# Patient Record
Sex: FEMALE | Race: BLACK OR AFRICAN AMERICAN | NOT HISPANIC OR LATINO | Employment: FULL TIME | URBAN - METROPOLITAN AREA
[De-identification: names, ages, dates, MRNs, and addresses within clinical notes are randomized per-mention and may not be internally consistent; named-entity substitution may affect disease eponyms.]

---

## 2024-10-11 ENCOUNTER — LAB (OUTPATIENT)
Dept: LAB | Facility: LAB | Age: 54
End: 2024-10-11
Payer: COMMERCIAL

## 2024-10-11 ENCOUNTER — APPOINTMENT (OUTPATIENT)
Dept: PRIMARY CARE | Facility: CLINIC | Age: 54
End: 2024-10-11
Payer: COMMERCIAL

## 2024-10-11 VITALS
DIASTOLIC BLOOD PRESSURE: 78 MMHG | WEIGHT: 218 LBS | BODY MASS INDEX: 33.04 KG/M2 | SYSTOLIC BLOOD PRESSURE: 118 MMHG | HEIGHT: 68 IN | TEMPERATURE: 97.2 F | OXYGEN SATURATION: 100 % | HEART RATE: 64 BPM

## 2024-10-11 DIAGNOSIS — Z00.00 ROUTINE GENERAL MEDICAL EXAMINATION AT A HEALTH CARE FACILITY: Primary | ICD-10-CM

## 2024-10-11 DIAGNOSIS — G47.33 OSA (OBSTRUCTIVE SLEEP APNEA): ICD-10-CM

## 2024-10-11 DIAGNOSIS — I10 PRIMARY HYPERTENSION: ICD-10-CM

## 2024-10-11 DIAGNOSIS — R73.03 PREDIABETES: ICD-10-CM

## 2024-10-11 DIAGNOSIS — E55.9 VITAMIN D DEFICIENCY: ICD-10-CM

## 2024-10-11 DIAGNOSIS — E78.5 HYPERLIPIDEMIA, UNSPECIFIED HYPERLIPIDEMIA TYPE: ICD-10-CM

## 2024-10-11 DIAGNOSIS — Z00.00 ROUTINE GENERAL MEDICAL EXAMINATION AT A HEALTH CARE FACILITY: ICD-10-CM

## 2024-10-11 DIAGNOSIS — G93.2 IIH (IDIOPATHIC INTRACRANIAL HYPERTENSION): ICD-10-CM

## 2024-10-11 PROBLEM — F41.1 GAD (GENERALIZED ANXIETY DISORDER): Status: ACTIVE | Noted: 2022-07-17

## 2024-10-11 PROBLEM — E11.9 TYPE 2 DIABETES MELLITUS WITHOUT COMPLICATION, WITHOUT LONG-TERM CURRENT USE OF INSULIN (MULTI): Status: ACTIVE | Noted: 2020-09-15

## 2024-10-11 PROBLEM — N39.0 RECURRENT UTI: Status: RESOLVED | Noted: 2017-11-13 | Resolved: 2024-10-11

## 2024-10-11 LAB
25(OH)D3 SERPL-MCNC: 20 NG/ML (ref 30–100)
ALBUMIN SERPL BCP-MCNC: 4.3 G/DL (ref 3.4–5)
ALP SERPL-CCNC: 139 U/L (ref 33–110)
ALT SERPL W P-5'-P-CCNC: 16 U/L (ref 7–45)
ANION GAP SERPL CALC-SCNC: 11 MMOL/L (ref 10–20)
AST SERPL W P-5'-P-CCNC: 15 U/L (ref 9–39)
BASOPHILS # BLD AUTO: 0.05 X10*3/UL (ref 0–0.1)
BASOPHILS NFR BLD AUTO: 1.1 %
BILIRUB SERPL-MCNC: 0.6 MG/DL (ref 0–1.2)
BUN SERPL-MCNC: 16 MG/DL (ref 6–23)
CALCIUM SERPL-MCNC: 9.1 MG/DL (ref 8.6–10.3)
CHLORIDE SERPL-SCNC: 111 MMOL/L (ref 98–107)
CHOLEST SERPL-MCNC: 242 MG/DL (ref 0–199)
CHOLESTEROL/HDL RATIO: 4.4
CO2 SERPL-SCNC: 23 MMOL/L (ref 21–32)
CREAT SERPL-MCNC: 0.77 MG/DL (ref 0.5–1.05)
EGFRCR SERPLBLD CKD-EPI 2021: >90 ML/MIN/1.73M*2
EOSINOPHIL # BLD AUTO: 0.14 X10*3/UL (ref 0–0.7)
EOSINOPHIL NFR BLD AUTO: 3.1 %
ERYTHROCYTE [DISTWIDTH] IN BLOOD BY AUTOMATED COUNT: 14.6 % (ref 11.5–14.5)
EST. AVERAGE GLUCOSE BLD GHB EST-MCNC: 108 MG/DL
GLUCOSE SERPL-MCNC: 101 MG/DL (ref 74–99)
HBA1C MFR BLD: 5.4 %
HCT VFR BLD AUTO: 38.1 % (ref 36–46)
HDLC SERPL-MCNC: 54.9 MG/DL
HGB BLD-MCNC: 12.9 G/DL (ref 12–16)
IMM GRANULOCYTES # BLD AUTO: 0.01 X10*3/UL (ref 0–0.7)
IMM GRANULOCYTES NFR BLD AUTO: 0.2 % (ref 0–0.9)
LDLC SERPL CALC-MCNC: 161 MG/DL
LYMPHOCYTES # BLD AUTO: 2.14 X10*3/UL (ref 1.2–4.8)
LYMPHOCYTES NFR BLD AUTO: 46.6 %
MCH RBC QN AUTO: 30.8 PG (ref 26–34)
MCHC RBC AUTO-ENTMCNC: 33.9 G/DL (ref 32–36)
MCV RBC AUTO: 91 FL (ref 80–100)
MONOCYTES # BLD AUTO: 0.33 X10*3/UL (ref 0.1–1)
MONOCYTES NFR BLD AUTO: 7.2 %
NEUTROPHILS # BLD AUTO: 1.92 X10*3/UL (ref 1.2–7.7)
NEUTROPHILS NFR BLD AUTO: 41.8 %
NON HDL CHOLESTEROL: 187 MG/DL (ref 0–149)
NRBC BLD-RTO: 0 /100 WBCS (ref 0–0)
PLATELET # BLD AUTO: 219 X10*3/UL (ref 150–450)
POTASSIUM SERPL-SCNC: 3.7 MMOL/L (ref 3.5–5.3)
PROT SERPL-MCNC: 7 G/DL (ref 6.4–8.2)
RBC # BLD AUTO: 4.19 X10*6/UL (ref 4–5.2)
SODIUM SERPL-SCNC: 141 MMOL/L (ref 136–145)
TRIGL SERPL-MCNC: 132 MG/DL (ref 0–149)
TSH SERPL-ACNC: 1.38 MIU/L (ref 0.44–3.98)
VLDL: 26 MG/DL (ref 0–40)
WBC # BLD AUTO: 4.6 X10*3/UL (ref 4.4–11.3)

## 2024-10-11 PROCEDURE — 82306 VITAMIN D 25 HYDROXY: CPT

## 2024-10-11 PROCEDURE — 1036F TOBACCO NON-USER: CPT | Performed by: INTERNAL MEDICINE

## 2024-10-11 PROCEDURE — 80061 LIPID PANEL: CPT

## 2024-10-11 PROCEDURE — 3008F BODY MASS INDEX DOCD: CPT | Performed by: INTERNAL MEDICINE

## 2024-10-11 PROCEDURE — 83036 HEMOGLOBIN GLYCOSYLATED A1C: CPT

## 2024-10-11 PROCEDURE — 80053 COMPREHEN METABOLIC PANEL: CPT

## 2024-10-11 PROCEDURE — 90471 IMMUNIZATION ADMIN: CPT | Performed by: INTERNAL MEDICINE

## 2024-10-11 PROCEDURE — 99204 OFFICE O/P NEW MOD 45 MIN: CPT | Performed by: INTERNAL MEDICINE

## 2024-10-11 PROCEDURE — 36415 COLL VENOUS BLD VENIPUNCTURE: CPT

## 2024-10-11 PROCEDURE — 3074F SYST BP LT 130 MM HG: CPT | Performed by: INTERNAL MEDICINE

## 2024-10-11 PROCEDURE — 90656 IIV3 VACC NO PRSV 0.5 ML IM: CPT | Performed by: INTERNAL MEDICINE

## 2024-10-11 PROCEDURE — 84443 ASSAY THYROID STIM HORMONE: CPT

## 2024-10-11 PROCEDURE — 85025 COMPLETE CBC W/AUTO DIFF WBC: CPT

## 2024-10-11 PROCEDURE — 3078F DIAST BP <80 MM HG: CPT | Performed by: INTERNAL MEDICINE

## 2024-10-11 RX ORDER — SALINE NASAL SPRAY 1.5 OZ
1 SOLUTION NASAL
COMMUNITY
Start: 2024-04-16

## 2024-10-11 RX ORDER — LIDOCAINE 50 MG/G
3 PATCH TOPICAL DAILY
Qty: 100 PATCH | Refills: 1 | Status: SHIPPED | OUTPATIENT
Start: 2024-10-11

## 2024-10-11 RX ORDER — CETIRIZINE HYDROCHLORIDE 10 MG/1
10 TABLET ORAL
COMMUNITY
Start: 2024-04-16

## 2024-10-11 RX ORDER — HYDROXYZINE HYDROCHLORIDE 50 MG/1
50 TABLET, FILM COATED ORAL EVERY 8 HOURS PRN
Qty: 30 TABLET | Refills: 3 | Status: SHIPPED | OUTPATIENT
Start: 2024-10-11 | End: 2024-11-10

## 2024-10-11 RX ORDER — FLUTICASONE PROPIONATE 50 MCG
1 SPRAY, SUSPENSION (ML) NASAL
COMMUNITY
Start: 2023-11-06

## 2024-10-11 RX ORDER — AMLODIPINE BESYLATE 10 MG/1
10 TABLET ORAL DAILY
COMMUNITY
End: 2024-10-11 | Stop reason: SDUPTHER

## 2024-10-11 RX ORDER — HYDROXYZINE HYDROCHLORIDE 50 MG/1
50 TABLET, FILM COATED ORAL EVERY 8 HOURS PRN
COMMUNITY
End: 2024-10-11 | Stop reason: SDUPTHER

## 2024-10-11 RX ORDER — MECLIZINE HYDROCHLORIDE 25 MG/1
25 TABLET ORAL
COMMUNITY
Start: 2024-09-12

## 2024-10-11 RX ORDER — ACETAZOLAMIDE 500 MG/1
500 CAPSULE, EXTENDED RELEASE ORAL 2 TIMES DAILY
COMMUNITY

## 2024-10-11 RX ORDER — AMLODIPINE BESYLATE 10 MG/1
10 TABLET ORAL DAILY
Qty: 90 TABLET | Refills: 3 | Status: SHIPPED | OUTPATIENT
Start: 2024-10-11

## 2024-10-11 RX ORDER — LIDOCAINE 50 MG/G
3 PATCH TOPICAL
COMMUNITY
Start: 2023-08-09 | End: 2024-10-11 | Stop reason: SDUPTHER

## 2024-10-11 RX ORDER — ATORVASTATIN CALCIUM 40 MG/1
40 TABLET, FILM COATED ORAL
COMMUNITY
Start: 2024-04-16 | End: 2025-04-16

## 2024-10-11 RX ORDER — AMMONIUM LACTATE 12 G/100G
LOTION TOPICAL
COMMUNITY
Start: 2023-02-27

## 2024-10-11 RX ORDER — OLOPATADINE HYDROCHLORIDE 1 MG/ML
1 SOLUTION/ DROPS OPHTHALMIC 2 TIMES DAILY
COMMUNITY
Start: 2024-04-16

## 2024-10-11 ASSESSMENT — ENCOUNTER SYMPTOMS
WOUND: 0
VOMITING: 0
DIZZINESS: 0
SORE THROAT: 0
APPETITE CHANGE: 0
NAUSEA: 0
TREMORS: 0
NUMBNESS: 0
WHEEZING: 0
WEAKNESS: 0
CONFUSION: 0
BLOOD IN STOOL: 0
FREQUENCY: 0
PALPITATIONS: 0
HEADACHES: 0
CONSTIPATION: 0
JOINT SWELLING: 0
EYE PAIN: 0
NERVOUS/ANXIOUS: 0
DYSURIA: 0
DIARRHEA: 0
SEIZURES: 0
FEVER: 0
ABDOMINAL PAIN: 0
SHORTNESS OF BREATH: 0
COUGH: 0
BACK PAIN: 0
EYE DISCHARGE: 0
TROUBLE SWALLOWING: 0
FLANK PAIN: 0
CHILLS: 0
HEMATURIA: 0
SLEEP DISTURBANCE: 0
UNEXPECTED WEIGHT CHANGE: 0

## 2024-10-11 ASSESSMENT — PATIENT HEALTH QUESTIONNAIRE - PHQ9: 1. LITTLE INTEREST OR PLEASURE IN DOING THINGS: NOT AT ALL

## 2024-10-11 NOTE — ASSESSMENT & PLAN NOTE
-She was recently started on Diamox, off topamax ( not effective), alos taking Meclizine and ear drops  -Had MRI of the brain and spinal tap on January of this year for headaches and tinnitus, pressure =26  -She has follow-up neurology appointment at  on October 29

## 2024-10-11 NOTE — PROGRESS NOTES
"Subjective   Patient ID: Camryn Durant is a 54 y.o. female who presents for Establish Care (Intercranial hypertension/Spinal headaches and numbness and tingling in the hands.//Lab work).    HPI   NEW PT ( previous PCP at [facility], [place])  Here to establish .  Chart reviewed, PMHX, meds, Social HX- updated at visit   Labs and imaging reviewed.  Ob-gyn - none  yet  Neurology at  - for IIH- next appt 29th/2024.    C scope 2020- polyps and diverticulosis- wants referral    Review of Systems   Constitutional:  Negative for appetite change, chills, fever and unexpected weight change.   HENT:  Positive for tinnitus (Rt ear). Negative for congestion, ear pain, sneezing, sore throat and trouble swallowing.    Eyes:  Negative for pain, discharge and visual disturbance.   Respiratory:  Negative for cough, shortness of breath and wheezing.    Cardiovascular:  Negative for chest pain, palpitations and leg swelling.   Gastrointestinal:  Negative for abdominal pain, blood in stool, constipation, diarrhea, nausea and vomiting.   Genitourinary:  Negative for dysuria, flank pain, frequency, hematuria and urgency.   Musculoskeletal:  Negative for back pain, gait problem and joint swelling.   Skin:  Negative for rash and wound.   Neurological:  Negative for dizziness, tremors, seizures, syncope, weakness, numbness and headaches.   Psychiatric/Behavioral:  Negative for confusion, sleep disturbance and suicidal ideas. The patient is not nervous/anxious.        Objective   /78   Pulse 64   Temp 36.2 °C (97.2 °F)   Ht 1.727 m (5' 8\")   Wt 98.9 kg (218 lb)   SpO2 100%   BMI 33.15 kg/m²     Physical Exam  Vitals and nursing note reviewed.   Constitutional:       General: She is not in acute distress.     Appearance: Normal appearance.   HENT:      Head: Normocephalic and atraumatic.      Nose: Nose normal.      Mouth/Throat:      Mouth: Mucous membranes are moist.      Pharynx: Oropharynx is clear.   Eyes:      Extraocular " Movements: Extraocular movements intact.      Conjunctiva/sclera: Conjunctivae normal.      Pupils: Pupils are equal, round, and reactive to light.   Cardiovascular:      Rate and Rhythm: Normal rate and regular rhythm.      Heart sounds: No murmur heard.  Pulmonary:      Effort: Pulmonary effort is normal. No respiratory distress.      Breath sounds: Normal breath sounds. No wheezing or rhonchi.   Abdominal:      General: Bowel sounds are normal.      Palpations: Abdomen is soft.      Tenderness: There is no abdominal tenderness.   Musculoskeletal:         General: Normal range of motion.      Cervical back: Neck supple.      Right lower leg: No edema.      Left lower leg: No edema.   Skin:     General: Skin is warm and dry.      Coloration: Skin is not pale.      Findings: No bruising or rash.   Neurological:      General: No focal deficit present.      Mental Status: She is alert and oriented to person, place, and time.      Motor: No weakness.      Gait: Gait normal.   Psychiatric:         Mood and Affect: Mood normal.         Behavior: Behavior normal.       Assessment/Plan   Assessment & Plan  Routine general medical examination at a health care facility    Orders:    CBC and Auto Differential; Future    Comprehensive Metabolic Panel; Future    TSH with reflex to Free T4 if abnormal; Future    Hemoglobin A1C; Future    Vitamin D 25-Hydroxy,Total (for eval of Vitamin D levels); Future    Lipid Panel; Future    Colonoscopy Screening; Average Risk Patient; Future    Prediabetes  -Per chart review last hemoglobin A1c was 6.1  -She was on injectable medicine for weight loss and that has been discontinued due to wt loss  Orders:    TSH with reflex to Free T4 if abnormal; Future    Hemoglobin A1C; Future    IIH (idiopathic intracranial hypertension)  -She was recently started on Diamox, off topamax ( not effective), alos taking Meclizine and ear drops  -Had MRI of the brain and spinal tap on January of this year for  headaches and tinnitus, pressure =26  -She has follow-up neurology appointment at  on October 29       CHRISTINE (obstructive sleep apnea)  -Chronic, and does not use CPAP on a daily basis, feels not needed due to wt loss       Primary hypertension  -blood Pressure is controlled on current medications( norvasc )        Hyperlipidemia, unspecified hyperlipidemia type  - adjust statin based on labs  Orders:    Lipid Panel; Future    Vitamin D deficiency    Orders:    Vitamin D 25-Hydroxy,Total (for eval of Vitamin D levels); Future     C scope ordered  Had recent mammogram  Meds refill  F/u 4 ms routine

## 2024-10-14 ENCOUNTER — TELEPHONE (OUTPATIENT)
Dept: PRIMARY CARE | Facility: CLINIC | Age: 54
End: 2024-10-14
Payer: COMMERCIAL

## 2024-10-14 NOTE — TELEPHONE ENCOUNTER
Patient is unreachable. No my chart to send a message to patient results might have to be mailed out to patient.

## 2024-10-14 NOTE — TELEPHONE ENCOUNTER
----- Message from Rita Morrison sent at 10/14/2024 10:21 AM EDT -----  Please notify patient that her cholesterol level is high, if she taking 40 mg of atorvastatin daily?   Vit D Level is low, she should take daily OTC vitamin D 2000 units, thanks

## 2024-10-29 ENCOUNTER — APPOINTMENT (OUTPATIENT)
Dept: NEUROLOGY | Facility: CLINIC | Age: 54
End: 2024-10-29
Payer: COMMERCIAL

## 2024-12-10 ENCOUNTER — APPOINTMENT (OUTPATIENT)
Dept: OBSTETRICS AND GYNECOLOGY | Facility: CLINIC | Age: 54
End: 2024-12-10
Payer: COMMERCIAL

## 2024-12-10 VITALS — BODY MASS INDEX: 33.25 KG/M2 | DIASTOLIC BLOOD PRESSURE: 73 MMHG | WEIGHT: 218.7 LBS | SYSTOLIC BLOOD PRESSURE: 108 MMHG

## 2024-12-10 DIAGNOSIS — Z12.31 ENCOUNTER FOR SCREENING MAMMOGRAM FOR BREAST CANCER: ICD-10-CM

## 2024-12-10 DIAGNOSIS — Z12.4 PAP SMEAR FOR CERVICAL CANCER SCREENING: ICD-10-CM

## 2024-12-10 DIAGNOSIS — Z01.419 WELL WOMAN EXAM WITH ROUTINE GYNECOLOGICAL EXAM: Primary | ICD-10-CM

## 2024-12-10 PROCEDURE — 3078F DIAST BP <80 MM HG: CPT | Performed by: NURSE PRACTITIONER

## 2024-12-10 PROCEDURE — 99386 PREV VISIT NEW AGE 40-64: CPT | Performed by: NURSE PRACTITIONER

## 2024-12-10 PROCEDURE — 3044F HG A1C LEVEL LT 7.0%: CPT | Performed by: NURSE PRACTITIONER

## 2024-12-10 PROCEDURE — 1036F TOBACCO NON-USER: CPT | Performed by: NURSE PRACTITIONER

## 2024-12-10 PROCEDURE — 3050F LDL-C >= 130 MG/DL: CPT | Performed by: NURSE PRACTITIONER

## 2024-12-10 PROCEDURE — 87624 HPV HI-RISK TYP POOLED RSLT: CPT

## 2024-12-10 PROCEDURE — 3074F SYST BP LT 130 MM HG: CPT | Performed by: NURSE PRACTITIONER

## 2024-12-10 NOTE — PROGRESS NOTES
HPI: Camryn Durant is a 54 y.o. year old  presenting for annual gyn exam   Pt new to this pratice  S/p hysterectomy -, AUB, fibroid and failed ablation, pathology benign but remote h/o LEEP  Single, has son 30s, and two others in 20s, has two grandchildren  Pt works at [employer] in [employer]  Working on weight loss, lost over 50lbs  Current concerns: none    LMP:  No LMP recorded. Patient has had a hysterectomy.; Menopause at 45-7yo, no HRT  Abnormal bleeding: no  Hot flashes/night sweats: better now  Sexually active: no  Vaginal dryness: no   STI concerns: no  Last mammogram: 10/2023 nml   Last pap:  normal, neg HPV  History of abnormal pap: LEEP  HGSIL, neg pap since then  Other gyn history: tubal pregnancy with unilateral salpingoophorectomy; LEEP, supracervical hyster/RSO   OB History    No obstetric history on file.      Past Medical History:  2017: Recurrent UTI   History reviewed. No pertinent surgical history.   Social History    Socioeconomic History      Marital status: Single      Spouse name: Not on file      Number of children: Not on file      Years of education: Not on file      Highest education level: Not on file    Occupational History      Not on file    Tobacco Use      Smoking status: Former        Types: Cigarettes      Smokeless tobacco: Never    Substance and Sexual Activity      Alcohol use: Yes        Comment: occasional beers      Drug use: Never      Sexual activity: Not on file    Other Topics      Concerns:        Not on file    Social History Narrative      Not on file    Social Drivers of Health  Financial Resource Strain: Medium Risk (2020)      Received from [employer]      Overall Financial Resource Strain (CARDIA)          Difficulty of Paying Living Expenses: Somewhat hard  Food Insecurity: No Food Insecurity (2020)      Received from [employer]      Hunger Vital Sign          Worried About Running Out of Food in the Last Year: Never  true          Ran Out of Food in the Last Year: Never true  Transportation Needs: No Transportation Needs (7/8/2020)      [facility]          Lack of Transportation (Medical): No          Lack of Transportation (Non-Medical): No  Physical Activity: Not on file  Stress: Not on file  Social Connections: Not on file  Intimate Partner Violence: Not on file  Housing Stability: Not on file   No family history on file.     Current Outpatient Medications:   acetaZOLAMIDE (Diamox) 500 mg 12 hr capsule, Take 1 capsule (500 mg) by mouth 2 times a day., Disp: , Rfl:   amLODIPine (Norvasc) 10 mg tablet, Take 1 tablet (10 mg) by mouth once daily., Disp: 90 tablet, Rfl: 3  ammonium lactate (Lac-Hydrin) 12 % lotion, Apply topically thin layer to affected area 2x daily., Disp: , Rfl:   cetirizine (ZyrTEC) 10 mg tablet, Take 1 tablet (10 mg) by mouth once daily., Disp: , Rfl:   Deep Sea Nasal 0.65 % nasal spray, Administer 1 spray into affected nostril(s)., Disp: , Rfl:   fluticasone (Flonase) 50 mcg/actuation nasal spray, Administer 1 spray into each nostril once daily., Disp: , Rfl:   hydrOXYzine HCL (Atarax) 50 mg tablet, Take 1 tablet (50 mg) by mouth every 8 hours if needed for itching., Disp: 30 tablet, Rfl: 3  lidocaine (Lidoderm) 5 % patch, Place 1 patch to the affected area of the skin once daily.  Leave the patch on for 12 hours then have a patch free interval for 12 hours.  May apply up to 3 patches daily as directed, Disp: 100 patch, Rfl: 1  meclizine (Antivert) 25 mg tablet, Take 1 tablet (25 mg) by mouth., Disp: , Rfl:           REVIEW OF SYSTEMS:  Breast. denies masses, nipple discharge  : denies dysuria, hematuria incontinence   Gl: denies change in bowel habits, blood in stools      PHYSICAL EXAM:  Vitals: /73 (BP Location: Left arm, Patient Position: Sitting)   Wt 99.2 kg (218 lb 11.2 oz)   BMI 33.25 kg/m²   Gen: well appearing woman in NAD  HEENT:  normocephalic, thyroid not enlarged, no lymphadenopathy  CV: no m/r/g  Chest: CTAB, no w/r/r  Breast: normal tissue bilaterally without masses, skin changes, lymphadenopathy   Abdomen: soft, nontender, no masses/hernias, liver and spleen not enlarged   Pelvic:  - external genitalia: normal  - vagina: normal; atrophic changes noted  - cervix: normal  - uterus and adnexa: surgically absent  -  no palpable masses or tenderness    ASSESSMENT/PLAN :    1) Health maintenance, Well-woman exam.  Pap done with HPV.  Mammogram ordered  Nutrition, exercise and routine health maintenance exams reviewed.  Calcium/Vitamin D supplementation information provided   Smoking cessation: non-smoker  2) Postmenopausal: no concerns  3) STD screening: declines, no concerns  4) Follow up one year or sooner as needed

## 2024-12-18 PROCEDURE — RXMED WILLOW AMBULATORY MEDICATION CHARGE

## 2024-12-26 LAB
CYTOLOGY CMNT CVX/VAG CYTO-IMP: NORMAL
HPV HR 12 DNA GENITAL QL NAA+PROBE: NEGATIVE
HPV HR GENOTYPES PNL CVX NAA+PROBE: NEGATIVE
HPV16 DNA SPEC QL NAA+PROBE: NEGATIVE
HPV18 DNA SPEC QL NAA+PROBE: NEGATIVE
LAB AP HPV GENOTYPE QUESTION: YES
LAB AP HPV HR: NORMAL
LAB AP TREATMENT HISTORY: NORMAL
LABORATORY COMMENT REPORT: NORMAL
MENSTRUAL HX REPORTED: NORMAL
PATH REPORT.RELEVANT HX SPEC: NORMAL
PATH REPORT.TOTAL CANCER: NORMAL

## 2024-12-27 ENCOUNTER — PHARMACY VISIT (OUTPATIENT)
Dept: PHARMACY | Facility: CLINIC | Age: 54
End: 2024-12-27
Payer: COMMERCIAL

## 2024-12-27 ENCOUNTER — TELEPHONE (OUTPATIENT)
Dept: OBSTETRICS AND GYNECOLOGY | Facility: CLINIC | Age: 54
End: 2024-12-27
Payer: COMMERCIAL

## 2024-12-27 NOTE — TELEPHONE ENCOUNTER
Called patient. Patient verified by name and . Patient informed of results. Patient has no questions or concerns

## 2024-12-27 NOTE — TELEPHONE ENCOUNTER
Please notify pt of normal pap and neg HPV. RTC one year for annual and sooner as needed. Jordana Narvaez, APRN-CNP

## 2025-01-06 NOTE — H&P
Procedure H&P    Patient Profile-Procedures  Name Camryn Durant  Date of Birth 1970  MRN [id number]  Address   [address]   [facility] [id number]   [facility] [place] [facility] [address]    Primary Phone Number [phone]  Secondary Phone Number    Rita Asher    Procedure(s):  Procedures: Colonoscopy  Primary contact name and number   Extended Emergency Contact Information  Primary Emergency Contact: Kayden Iglesias  Mobile Phone: [phone]  Relation: Son  Preferred language: English   needed? No    General Health  Weight There were no vitals filed for this visit.  BMI There is no height or weight on file to calculate BMI.    Allergies  Allergies   Allergen Reactions    Sulfa (Sulfonamide Antibiotics) Hives and Itching    Sulfa Dyne Hives       Past Medical History   Past Medical History:   Diagnosis Date    Recurrent UTI 11/13/2017       Provider assessment  Diagnosis: Colon Cancer Screening/Surveillance   Medication Reviewed - yes  Prior to Admission medications    Medication Sig Start Date End Date Taking? Authorizing Provider   acetaZOLAMIDE (Diamox) 500 mg 12 hr capsule Take 1 capsule (500 mg) by mouth 2 times a day.    Historical Provider, MD   amLODIPine (Norvasc) 10 mg tablet Take 1 tablet (10 mg) by mouth once daily. 10/11/24   Rita Morrison MD   ammonium lactate (Lac-Hydrin) 12 % lotion Apply topically thin layer to affected area 2x daily. 2/27/23   Historical Provider, MD   cetirizine (ZyrTEC) 10 mg tablet Take 1 tablet (10 mg) by mouth once daily. 4/16/24   Historical Provider, MD   Deep Sea Nasal 0.65 % nasal spray Administer 1 spray into affected nostril(s). 4/16/24   Historical Provider, MD   fluticasone (Flonase) 50 mcg/actuation nasal spray Administer 1 spray into each nostril once daily. 11/6/23   Historical Provider, MD   hydrOXYzine HCL (Atarax) 50 mg tablet Take 1 tablet (50 mg) by mouth every 8 hours if needed for itching. 10/11/24 1/6/25  Rita Morrison MD    lidocaine (Lidoderm) 5 % patch Place 1 patch to the affected area of the skin once daily.  Leave the patch on for 12 hours then have a patch free interval for 12 hours.  May apply up to 3 patches daily as directed 10/11/24   Rita Morrison MD   meclizine (Antivert) 25 mg tablet Take 1 tablet (25 mg) by mouth. 9/12/24   Historical Provider, MD       Physical Exam  There were no vitals filed for this visit.     General: A&Ox3, NAD.  HEENT: AT/NC.   CV: RRR. No murmur.  Resp: CTA bilaterally. No wheezing, rhonchi or rales.   GI: Soft, NT/ND. BSx4.  Extrem: No edema. Pulses intact.  Neuro: No focal deficits.   Psych: Normal mood and affect.      Procedure Plan - pre-procedural (re)assesment completed by physician:  discharge/transfer patient when discharge criteria met    Rikki Jim MD  1/6/2025 9:13 AM

## 2025-01-08 ENCOUNTER — HOSPITAL ENCOUNTER (OUTPATIENT)
Dept: GASTROENTEROLOGY | Facility: HOSPITAL | Age: 55
Discharge: HOME | End: 2025-01-08
Payer: COMMERCIAL

## 2025-01-08 ENCOUNTER — ANESTHESIA EVENT (OUTPATIENT)
Dept: GASTROENTEROLOGY | Facility: HOSPITAL | Age: 55
End: 2025-01-08
Payer: COMMERCIAL

## 2025-01-08 ENCOUNTER — ANESTHESIA (OUTPATIENT)
Dept: GASTROENTEROLOGY | Facility: HOSPITAL | Age: 55
End: 2025-01-08
Payer: COMMERCIAL

## 2025-01-08 VITALS
BODY MASS INDEX: 33.15 KG/M2 | HEIGHT: 68 IN | OXYGEN SATURATION: 100 % | RESPIRATION RATE: 16 BRPM | HEART RATE: 56 BPM | TEMPERATURE: 98.2 F | WEIGHT: 218.7 LBS | DIASTOLIC BLOOD PRESSURE: 71 MMHG | SYSTOLIC BLOOD PRESSURE: 133 MMHG

## 2025-01-08 DIAGNOSIS — Z00.00 ROUTINE GENERAL MEDICAL EXAMINATION AT A HEALTH CARE FACILITY: ICD-10-CM

## 2025-01-08 LAB — GLUCOSE BLD MANUAL STRIP-MCNC: 99 MG/DL (ref 74–99)

## 2025-01-08 PROCEDURE — 3700000002 HC GENERAL ANESTHESIA TIME - EACH INCREMENTAL 1 MINUTE

## 2025-01-08 PROCEDURE — 7100000009 HC PHASE TWO TIME - INITIAL BASE CHARGE

## 2025-01-08 PROCEDURE — 2500000004 HC RX 250 GENERAL PHARMACY W/ HCPCS (ALT 636 FOR OP/ED): Performed by: NURSE ANESTHETIST, CERTIFIED REGISTERED

## 2025-01-08 PROCEDURE — 45380 COLONOSCOPY AND BIOPSY: CPT | Performed by: STUDENT IN AN ORGANIZED HEALTH CARE EDUCATION/TRAINING PROGRAM

## 2025-01-08 PROCEDURE — A45385 PR COLONOSCOPY,REMV LESN,SNARE: Performed by: ANESTHESIOLOGY

## 2025-01-08 PROCEDURE — 3700000001 HC GENERAL ANESTHESIA TIME - INITIAL BASE CHARGE

## 2025-01-08 PROCEDURE — 45385 COLONOSCOPY W/LESION REMOVAL: CPT | Performed by: STUDENT IN AN ORGANIZED HEALTH CARE EDUCATION/TRAINING PROGRAM

## 2025-01-08 PROCEDURE — 7100000010 HC PHASE TWO TIME - EACH INCREMENTAL 1 MINUTE

## 2025-01-08 PROCEDURE — 82947 ASSAY GLUCOSE BLOOD QUANT: CPT

## 2025-01-08 PROCEDURE — A45385 PR COLONOSCOPY,REMV LESN,SNARE: Performed by: NURSE ANESTHETIST, CERTIFIED REGISTERED

## 2025-01-08 RX ORDER — ONDANSETRON HYDROCHLORIDE 2 MG/ML
4 INJECTION, SOLUTION INTRAVENOUS ONCE AS NEEDED
Status: DISCONTINUED | OUTPATIENT
Start: 2025-01-08 | End: 2025-01-09 | Stop reason: HOSPADM

## 2025-01-08 RX ORDER — PROPOFOL 10 MG/ML
INJECTION, EMULSION INTRAVENOUS AS NEEDED
Status: DISCONTINUED | OUTPATIENT
Start: 2025-01-08 | End: 2025-01-08

## 2025-01-08 RX ORDER — LIDOCAINE HCL/PF 100 MG/5ML
SYRINGE (ML) INTRAVENOUS AS NEEDED
Status: DISCONTINUED | OUTPATIENT
Start: 2025-01-08 | End: 2025-01-08

## 2025-01-08 RX ORDER — KETAMINE HCL IN NACL, ISO-OSM 100MG/10ML
SYRINGE (ML) INJECTION AS NEEDED
Status: DISCONTINUED | OUTPATIENT
Start: 2025-01-08 | End: 2025-01-08

## 2025-01-08 RX ADMIN — PROPOFOL 100 MCG/KG/MIN: 10 INJECTION, EMULSION INTRAVENOUS at 11:29

## 2025-01-08 RX ADMIN — LIDOCAINE HYDROCHLORIDE 60 MG: 20 INJECTION, SOLUTION INTRAVENOUS at 11:28

## 2025-01-08 RX ADMIN — Medication 30 MG: at 11:34

## 2025-01-08 RX ADMIN — PROPOFOL 50 MG: 10 INJECTION, EMULSION INTRAVENOUS at 11:34

## 2025-01-08 RX ADMIN — PROPOFOL 80 MG: 10 INJECTION, EMULSION INTRAVENOUS at 11:28

## 2025-01-08 SDOH — HEALTH STABILITY: MENTAL HEALTH: CURRENT SMOKER: 0

## 2025-01-08 ASSESSMENT — COLUMBIA-SUICIDE SEVERITY RATING SCALE - C-SSRS
2. HAVE YOU ACTUALLY HAD ANY THOUGHTS OF KILLING YOURSELF?: NO
1. IN THE PAST MONTH, HAVE YOU WISHED YOU WERE DEAD OR WISHED YOU COULD GO TO SLEEP AND NOT WAKE UP?: NO
6. HAVE YOU EVER DONE ANYTHING, STARTED TO DO ANYTHING, OR PREPARED TO DO ANYTHING TO END YOUR LIFE?: NO

## 2025-01-08 ASSESSMENT — PAIN SCALES - GENERAL
PAIN_LEVEL: 0
PAINLEVEL_OUTOF10: 0 - NO PAIN

## 2025-01-08 ASSESSMENT — PAIN - FUNCTIONAL ASSESSMENT: PAIN_FUNCTIONAL_ASSESSMENT: 0-10

## 2025-01-08 NOTE — ANESTHESIA PREPROCEDURE EVALUATION
Patient: Camryn Durant    Procedure Information       Date/Time: 01/08/25 1200    Scheduled providers: Rikki Jim MD; Braulio Garibay MD    Procedure: COLONOSCOPY    Location: [facility]            Relevant Problems   Anesthesia (within normal limits)      Cardiac   (+) Hyperlipidemia   (+) Primary hypertension      Pulmonary   (+) CHRISTINE (obstructive sleep apnea)      Neuro   (+) NICOLAS (generalized anxiety disorder)   (+) IIH (idiopathic intracranial hypertension)      Endocrine   (+) Type 2 diabetes mellitus without complication, without long-term current use of insulin (Multi)       Clinical information reviewed:   Tobacco  Allergies  Meds  Problems              NPO Detail:  NPO/Void Status  Date of Last Liquid: 01/08/25  Time of Last Liquid: 0600  Date of Last Solid: 01/07/25  Time of Last Solid: 0800         Physical Exam    Airway  Mallampati: III  TM distance: >3 FB  Neck ROM: full     Cardiovascular - normal exam     Dental - normal exam     Pulmonary - normal exam     Abdominal - normal exam             Anesthesia Plan    History of general anesthesia?: yes  History of complications of general anesthesia?: no    ASA 3     MAC     The patient is not a current smoker.    intravenous induction   Anesthetic plan and risks discussed with patient.    Plan discussed with CRNA.

## 2025-01-08 NOTE — ANESTHESIA POSTPROCEDURE EVALUATION
Patient: Camryn Durant    Procedure Summary       Date: 01/08/25 Room / Location: [facility]    Anesthesia Start: 1125 Anesthesia Stop: 1152    Procedure: COLONOSCOPY Diagnosis: Routine general medical examination at a health care facility    Scheduled Providers: Rikki Jim MD; Braulio Garibay MD Responsible Provider: Braulio Garibay MD    Anesthesia Type: MAC ASA Status: 3            Anesthesia Type: MAC    Vitals Value Taken Time   /74 01/08/25 1200   Temp 36.8 °C (98.2 °F) 01/08/25 1150   Pulse 52 01/08/25 1200   Resp 16 01/08/25 1200   SpO2 100 % 01/08/25 1200       Anesthesia Post Evaluation    Patient location during evaluation: PACU  Patient participation: complete - patient participated  Level of consciousness: awake and alert  Pain score: 0  Pain management: adequate  Airway patency: patent  Cardiovascular status: acceptable and hemodynamically stable  Respiratory status: acceptable, spontaneous ventilation and nasal cannula  Hydration status: acceptable  Postoperative Nausea and Vomiting: none        There were no known notable events for this encounter.

## 2025-01-08 NOTE — DISCHARGE INSTRUCTIONS
Patient Instructions after an Endoscopy      Post-procedure recommendations:  - The patient will be observed post-procedure, until all discharge criteria are met.   - Discharge the patient to home with family member/escort.  - Resume previous diet.  - Continue present medications.  - Observe patient's clinical course following today's procedure with therapeutic intervention.   - Watch for bleeding, perforation, and infection.   - Follow-up with referring physician.   - Return to primary care physician.  - The patient has a contact number available for  emergencies.  The signs and symptoms of potential delayed complications were discussed with the patient.  Return to normal activities tomorrow.  Written discharge instructions were provided to the patient.    The anesthetics, sedatives or narcotics which were given to you today will be acting in your body for the next 24 hours, so you might feel a little sleepy or groggy.  This feeling should slowly wear off. Carefully read and follow the instructions.     You received sedation today:  - Do not drive or operate any machinery or power tools of any kind.   - No alcoholic beverages today, not even beer or wine.  - Do not make any important decisions or sign any legal documents.  - No over the counter medications that contain alcohol or that may cause drowsiness.  - Do not make any important decisions or sign any legal documents.    While it is common to experience mild to moderate abdominal distention, gas, or belching after your procedure, if any of these symptoms occur following discharge from the GI Lab or within one week of having your procedure, call the [facility] to be advised whether a visit to your nearest Urgent Care or Emergency Department is indicated.  Take this paper with you if you go:  - If you develop an allergic reaction to the medications that were given during your procedure such as difficulty breathing, rash, hives, severe nausea,  vomiting or lightheadedness.  - If you experience chest pain, shortness of breath, severe abdominal pain, fevers and chills.  - If you develop signs and symptoms of bleeding such as blood in your spit, if your stools turn black, tarry, or bloody.  - If you have not urinated within 8 hours following your procedure.  - If your IV site becomes painful, red, inflamed, or looks infected.       If you experience any problems or have any questions following discharge from the GI Lab, please call: [phone]

## 2025-01-14 LAB
LABORATORY COMMENT REPORT: NORMAL
PATH REPORT.FINAL DX SPEC: NORMAL
PATH REPORT.GROSS SPEC: NORMAL
PATH REPORT.RELEVANT HX SPEC: NORMAL
PATH REPORT.TOTAL CANCER: NORMAL

## 2025-01-17 ENCOUNTER — OFFICE VISIT (OUTPATIENT)
Dept: URGENT CARE | Age: 55
End: 2025-01-17
Payer: COMMERCIAL

## 2025-01-17 VITALS
RESPIRATION RATE: 17 BRPM | SYSTOLIC BLOOD PRESSURE: 113 MMHG | WEIGHT: 220 LBS | OXYGEN SATURATION: 99 % | DIASTOLIC BLOOD PRESSURE: 75 MMHG | HEART RATE: 53 BPM | BODY MASS INDEX: 33.34 KG/M2 | TEMPERATURE: 98.4 F | HEIGHT: 68 IN

## 2025-01-17 DIAGNOSIS — L03.211 CELLULITIS OF FACE: Primary | ICD-10-CM

## 2025-01-17 RX ORDER — MUPIROCIN 20 MG/G
OINTMENT TOPICAL
Qty: 22 G | Refills: 0 | Status: SHIPPED | OUTPATIENT
Start: 2025-01-17 | End: 2025-01-27

## 2025-01-17 RX ORDER — CLINDAMYCIN HYDROCHLORIDE 300 MG/1
300 CAPSULE ORAL 3 TIMES DAILY
Qty: 30 CAPSULE | Refills: 0 | Status: SHIPPED | OUTPATIENT
Start: 2025-01-17 | End: 2025-01-27

## 2025-01-17 ASSESSMENT — PATIENT HEALTH QUESTIONNAIRE - PHQ9
1. LITTLE INTEREST OR PLEASURE IN DOING THINGS: NOT AT ALL
2. FEELING DOWN, DEPRESSED OR HOPELESS: NOT AT ALL
SUM OF ALL RESPONSES TO PHQ9 QUESTIONS 1 AND 2: 0

## 2025-01-17 NOTE — PATIENT INSTRUCTIONS
Put ointment on skin twice a day  Take antibiotics  Consider seeing gen surgery or plastics if wound appears as if it  needs to be drained

## 2025-01-17 NOTE — PROGRESS NOTES
"Subjective   Patient ID: Camryn Durant is a 54 y.o. female. They present today with a chief complaint of pimple/cyst on chin (Patient had a pimple of the left side of her face she was picking at it now it feels like a hard lump with puss ).    History of Present Illness  Pt is a 54 year old female who presents with a lump under her skin on her left lower jaw she stated it had burst and drained slightly.             Past Medical History  Allergies as of 01/17/2025 - Reviewed 01/17/2025   Allergen Reaction Noted    Sulfa (sulfonamide antibiotics) Hives and Itching 05/07/2008    Sulfa dyne Hives 10/11/2024       (Not in a hospital admission)       Past Medical History:   Diagnosis Date    Recurrent UTI 11/13/2017       Past Surgical History:   Procedure Laterality Date    HYSTERECTOMY N/A     supracervical with oophorectomy        reports that she has quit smoking. Her smoking use included cigarettes. She has never used smokeless tobacco. She reports current alcohol use. She reports that she does not use drugs.    Review of Systems  Review of Systems   Skin:         Cyst on left lower jaw line                                  Objective    Vitals:    01/17/25 1448   BP: 113/75   BP Location: Left arm   Patient Position: Sitting   BP Cuff Size: Adult   Pulse: 53   Resp: 17   Temp: 36.9 °C (98.4 °F)   TempSrc: Oral   SpO2: 99%   Weight: 99.8 kg (220 lb)   Height: 1.727 m (5' 8\")     No LMP recorded. Patient has had a hysterectomy.    Physical Exam  Constitutional:       Appearance: Normal appearance.   HENT:      Head: Normocephalic and atraumatic.   Skin:     Comments: Small cyst on left side of lower jaw margin which tacks toward joint   Neurological:      Mental Status: She is alert.         Procedures    Point of Care Test & Imaging Results from this visit  No results found for this visit on 01/17/25.   No results found.    Diagnostic study results (if any) were reviewed by Elsa Blancas MD.    Assessment/Plan "   Allergies, medications, history, and pertinent labs/EKGs/Imaging reviewed by Elsa Blancas MD.     Medical Decision Making  Skin topically numbed with lidocaine jel  Needle advanced after cleaning with betadine  No fluid found in cyst st this time  Will treat with antibiotics and follow   May need cyst excise via surgery if recurs or doesn't get better    Orders and Diagnoses  Diagnoses and all orders for this visit:  Cellulitis of face  -     mupirocin (Bactroban) 2 % ointment; Apply topically 3 times a day for 10 days.  -     clindamycin (Cleocin HCL) 300 mg capsule; Take 1 capsule (300 mg) by mouth 3 times a day for 10 days.      Medical Admin Record      Patient disposition: Home    Electronically signed by Elsa Blancas MD  3:44 PM

## 2025-02-21 ENCOUNTER — APPOINTMENT (OUTPATIENT)
Dept: PRIMARY CARE | Facility: CLINIC | Age: 55
End: 2025-02-21
Payer: COMMERCIAL

## 2025-02-21 VITALS
DIASTOLIC BLOOD PRESSURE: 76 MMHG | SYSTOLIC BLOOD PRESSURE: 122 MMHG | TEMPERATURE: 98.2 F | WEIGHT: 212 LBS | HEART RATE: 74 BPM | HEIGHT: 68 IN | BODY MASS INDEX: 32.13 KG/M2 | OXYGEN SATURATION: 97 %

## 2025-02-21 DIAGNOSIS — H93.A1 PULSATILE TINNITUS OF RIGHT EAR: ICD-10-CM

## 2025-02-21 DIAGNOSIS — G93.2 IIH (IDIOPATHIC INTRACRANIAL HYPERTENSION): Primary | ICD-10-CM

## 2025-02-21 DIAGNOSIS — E55.9 VITAMIN D DEFICIENCY: ICD-10-CM

## 2025-02-21 DIAGNOSIS — E78.5 HYPERLIPIDEMIA, UNSPECIFIED HYPERLIPIDEMIA TYPE: ICD-10-CM

## 2025-02-21 PROBLEM — E11.9 TYPE 2 DIABETES MELLITUS WITHOUT COMPLICATION, WITHOUT LONG-TERM CURRENT USE OF INSULIN (MULTI): Status: RESOLVED | Noted: 2020-09-15 | Resolved: 2025-02-21

## 2025-02-21 PROCEDURE — 99214 OFFICE O/P EST MOD 30 MIN: CPT | Performed by: INTERNAL MEDICINE

## 2025-02-21 PROCEDURE — 1036F TOBACCO NON-USER: CPT | Performed by: INTERNAL MEDICINE

## 2025-02-21 PROCEDURE — 3078F DIAST BP <80 MM HG: CPT | Performed by: INTERNAL MEDICINE

## 2025-02-21 PROCEDURE — 3008F BODY MASS INDEX DOCD: CPT | Performed by: INTERNAL MEDICINE

## 2025-02-21 PROCEDURE — 3074F SYST BP LT 130 MM HG: CPT | Performed by: INTERNAL MEDICINE

## 2025-02-21 RX ORDER — ATORVASTATIN CALCIUM 20 MG/1
20 TABLET, FILM COATED ORAL DAILY
Qty: 30 TABLET | Refills: 11 | Status: SHIPPED | OUTPATIENT
Start: 2025-02-21 | End: 2026-02-21

## 2025-02-21 ASSESSMENT — ENCOUNTER SYMPTOMS
WOUND: 0
DIZZINESS: 1
CHILLS: 0
CONSTIPATION: 0
TROUBLE SWALLOWING: 0
CONFUSION: 0
FEVER: 0
TREMORS: 0
SLEEP DISTURBANCE: 0
HEADACHES: 1
BLOOD IN STOOL: 0
HEMATURIA: 0
BACK PAIN: 0
EYE PAIN: 0
PALPITATIONS: 0
JOINT SWELLING: 0
DYSURIA: 0
WEAKNESS: 0
APPETITE CHANGE: 0
SORE THROAT: 0
UNEXPECTED WEIGHT CHANGE: 0
DIARRHEA: 0
SEIZURES: 0
NAUSEA: 0
NERVOUS/ANXIOUS: 0
FREQUENCY: 0
COUGH: 0
ABDOMINAL PAIN: 0
FLANK PAIN: 0
VOMITING: 0
EYE DISCHARGE: 0
NUMBNESS: 0
WHEEZING: 0
SHORTNESS OF BREATH: 0

## 2025-02-21 NOTE — PROGRESS NOTES
"Subjective   Patient ID: Camryn Durant is a 55 y.o. female who presents for Follow-up (Pt is here for F/U BW results, referral to neurology, go over results for colonoscopy  ).    HPI   Patient returns for follow-up visit- 4 months    Labs 2024:  \" cholesterol level is high, are you taking 40 mg of atorvastatin daily?  Heart healthy diet , exercise and weight loss will help.   Vit D Level is low, should take daily OTC vitamin D 2000 units, thanks  A1c is normal, no longer pREdiabetic !!!  Pt stated unaware of above message.       C scope : Jan 2025:    Result Text   Impression  Extensive diverticulosis of severe severity causing mild luminal narrowing  Subcentimeter polyp in the ascending colon was removed with cold snare  Abnormal mucosa in the ascending colon; electronic chromoendoscopy (NBI) was used; performed cold forceps biopsy        Findings  Multiple small and large, extensive pancolonic severe diverticula causing mild luminal narrowing  One sessile and adenomatous-appearing polyp measuring smaller than 5 mm in the ascending colon; no bleeding was observed; performed cold snare with complete en bloc removal and retrieved specimen  Abnormal mucosa in the ascending colon; electronic chromoendoscopy (NBI) was used; performed cold forceps biopsy. There was thickening of a fold which was sampled to evaluate for adenomatous changes;        Recommendation  Await pathology results   Repeat colonoscopy in 7 years, due: 1/7/2032        Review of Systems   Constitutional:  Negative for appetite change, chills, fever and unexpected weight change.   HENT:  Positive for tinnitus (Rt ear- pulsatile). Negative for congestion, ear pain, sneezing, sore throat and trouble swallowing.    Eyes:  Negative for pain, discharge and visual disturbance.   Respiratory:  Negative for cough, shortness of breath and wheezing.    Cardiovascular:  Negative for chest pain, palpitations and leg swelling.   Gastrointestinal:  Negative for " "abdominal pain, blood in stool, constipation, diarrhea, nausea and vomiting.   Genitourinary:  Negative for dysuria, flank pain, frequency, hematuria and urgency.   Musculoskeletal:  Negative for back pain, gait problem and joint swelling.   Skin:  Negative for rash and wound.   Neurological:  Positive for dizziness and headaches. Negative for tremors, seizures, syncope, weakness and numbness.   Psychiatric/Behavioral:  Negative for confusion, sleep disturbance and suicidal ideas. The patient is not nervous/anxious.        Objective   /76   Pulse 74   Temp 36.8 °C (98.2 °F)   Ht 1.727 m (5' 8\")   Wt 96.2 kg (212 lb)   SpO2 97%   BMI 32.23 kg/m²          Physical Exam  Vitals and nursing note reviewed.   Constitutional:       General: She is not in acute distress.     Appearance: Normal appearance.   HENT:      Head: Normocephalic and atraumatic.      Nose: Nose normal.      Mouth/Throat:      Mouth: Mucous membranes are moist.      Pharynx: Oropharynx is clear.   Eyes:      Extraocular Movements: Extraocular movements intact.      Conjunctiva/sclera: Conjunctivae normal.      Pupils: Pupils are equal, round, and reactive to light.   Cardiovascular:      Rate and Rhythm: Normal rate and regular rhythm.      Heart sounds: No murmur heard.  Pulmonary:      Effort: Pulmonary effort is normal. No respiratory distress.      Breath sounds: Normal breath sounds. No wheezing or rhonchi.   Abdominal:      General: Bowel sounds are normal.      Palpations: Abdomen is soft.      Tenderness: There is no abdominal tenderness.   Musculoskeletal:         General: Normal range of motion.      Cervical back: Neck supple.      Right lower leg: No edema.      Left lower leg: No edema.   Skin:     General: Skin is warm and dry.      Coloration: Skin is not pale.      Findings: No bruising or rash.   Neurological:      General: No focal deficit present.      Mental Status: She is alert and oriented to person, place, and time. " "     Motor: No weakness.      Gait: Gait normal.   Psychiatric:         Mood and Affect: Mood normal.         Behavior: Behavior normal.         Assessment/Plan   Assessment & Plan  IIH (idiopathic intracranial hypertension)  - chronic   - on Diamox and PRN Atrax she takes at bedtime  -  unable to see   neurologist back in Nov as was told \" wrong place\" or \" wrong referral\"  - requesting new referrral  Orders:    Referral to Neurology; Future    Pulsatile tinnitus of right ear  - requesting ENT referral   Orders:    Referral to ENT; Future    Hyperlipidemia, unspecified hyperlipidemia type  - chronic, uncontrolled  - GRS=219 fall 2024  - resume Atorvastatin- Rx 20 mg today   - losing weight intentional , and healthier eating habits   Orders:    atorvastatin (Lipitor) 20 mg tablet; Take 1 tablet (20 mg) by mouth once daily.    Vitamin D deficiency  -  Vit D Level is low, should take daily OTC vitamin D 2000 units,       RTC 6 months        "

## 2025-02-21 NOTE — PATIENT INSTRUCTIONS
Vit D Level is low, Please take daily OTC vitamin D 2000 units,    Please eat a Heart healthy diet , exercise and weight loss will help.

## 2025-02-21 NOTE — ASSESSMENT & PLAN NOTE
- chronic, uncontrolled  - LXO=930 fall 2024  - resume Atorvastatin- Rx 20 mg today   - losing weight intentional , and healthier eating habits   Orders:    atorvastatin (Lipitor) 20 mg tablet; Take 1 tablet (20 mg) by mouth once daily.

## 2025-02-21 NOTE — ASSESSMENT & PLAN NOTE
"- chronic   - on Diamox and PRN Atrax she takes at bedtime  -  unable to see   neurologist back in Nov as was told \" wrong place\" or \" wrong referral\"  - requesting new referrral  Orders:    Referral to Neurology; Future    "

## 2025-03-19 PROCEDURE — RXMED WILLOW AMBULATORY MEDICATION CHARGE

## 2025-03-26 ENCOUNTER — PHARMACY VISIT (OUTPATIENT)
Dept: PHARMACY | Facility: CLINIC | Age: 55
End: 2025-03-26
Payer: COMMERCIAL

## 2025-04-07 ENCOUNTER — APPOINTMENT (OUTPATIENT)
Dept: OTOLARYNGOLOGY | Facility: CLINIC | Age: 55
End: 2025-04-07
Payer: COMMERCIAL

## 2025-04-17 ENCOUNTER — OFFICE VISIT (OUTPATIENT)
Dept: URGENT CARE | Age: 55
End: 2025-04-17
Payer: COMMERCIAL

## 2025-04-17 VITALS
RESPIRATION RATE: 18 BRPM | HEART RATE: 70 BPM | BODY MASS INDEX: 36.73 KG/M2 | WEIGHT: 248 LBS | SYSTOLIC BLOOD PRESSURE: 117 MMHG | HEIGHT: 69 IN | OXYGEN SATURATION: 98 % | TEMPERATURE: 98.3 F | DIASTOLIC BLOOD PRESSURE: 77 MMHG

## 2025-04-17 DIAGNOSIS — R05.1 ACUTE COUGH: ICD-10-CM

## 2025-04-17 DIAGNOSIS — R19.7 DIARRHEA, UNSPECIFIED TYPE: ICD-10-CM

## 2025-04-17 DIAGNOSIS — R30.0 DYSURIA: Primary | ICD-10-CM

## 2025-04-17 DIAGNOSIS — J06.9 VIRAL URI: ICD-10-CM

## 2025-04-17 DIAGNOSIS — Z20.822 SUSPECTED COVID-19 VIRUS INFECTION: ICD-10-CM

## 2025-04-17 LAB
POC APPEARANCE, URINE: CLEAR
POC BILIRUBIN, URINE: ABNORMAL
POC BLOOD, URINE: NEGATIVE
POC COLOR, URINE: YELLOW
POC CORONAVIRUS SARS-COV-2 PCR: NEGATIVE
POC GLUCOSE, URINE: NEGATIVE MG/DL
POC HUMAN RHINOVIRUS PCR: NEGATIVE
POC INFLUENZA A VIRUS PCR: NEGATIVE
POC INFLUENZA B VIRUS PCR: NEGATIVE
POC KETONES, URINE: NEGATIVE MG/DL
POC LEUKOCYTES, URINE: NEGATIVE
POC NITRITE,URINE: NEGATIVE
POC PH, URINE: 6 PH
POC PROTEIN, URINE: NEGATIVE MG/DL
POC RESPIRATORY SYNCYTIAL VIRUS PCR: NEGATIVE
POC SPECIFIC GRAVITY, URINE: >=1.03
POC UROBILINOGEN, URINE: 0.2 EU/DL

## 2025-04-17 RX ORDER — BENZONATATE 200 MG/1
200 CAPSULE ORAL 3 TIMES DAILY PRN
Qty: 21 CAPSULE | Refills: 0 | Status: SHIPPED | OUTPATIENT
Start: 2025-04-17 | End: 2025-04-24

## 2025-04-17 ASSESSMENT — PATIENT HEALTH QUESTIONNAIRE - PHQ9
SUM OF ALL RESPONSES TO PHQ9 QUESTIONS 1 AND 2: 0
2. FEELING DOWN, DEPRESSED OR HOPELESS: NOT AT ALL
1. LITTLE INTEREST OR PLEASURE IN DOING THINGS: NOT AT ALL

## 2025-04-17 ASSESSMENT — ENCOUNTER SYMPTOMS: DIARRHEA: 1

## 2025-04-17 NOTE — PATIENT INSTRUCTIONS
Warm liquids with honey  Increase fluid intake; encourage electrolytes such as Pedialyte  10 deep breaths an hour  May use tylenol/NSAIDs as needed for fevers, chills, body aches.     Diarrhea:  clear liquid diet is recommended for the next 12-24 hour such as water, flat clear soda, electrolyte drinks, bone broth, chicken broth. If tolerated pt. May advance to light diet such as B.R.A.T diet adding dairy and meats back into diet last and as tolerated. Avoid high fat, high sugar, acidic foods. If pt. Is unable to tolerate clear liquid diet proceed to ED for potential IV rehydration.

## 2025-04-17 NOTE — PROGRESS NOTES
"Subjective   Patient ID: Camryn Durant is a 55 y.o. female. They present today with a chief complaint of Diarrhea (X 3 days).    History of Present Illness    Diarrhea   55-year-old patient presents to clinic with complaints of multiple episodes of watery diarrhea with associated intermittent abdominal cramping, chills, rhinorrhea, sneezing, dry cough, nausea, dysuria ongoing for the past 3 days. About 10-15 episodes daily. Reports has tried over-the-counter medication such as Pepto-Bismol, with flower water  without relief.   Reports currently not sexually active.  Reports no possibility of STI/STD.  Denies fevers,   shortness of breath, sore throat, sinus pain, flank pain, hematuria, vaginal discharge, possible food source for the diarrhea.     Past Medical History  Allergies as of 04/17/2025 - Reviewed 04/17/2025   Allergen Reaction Noted    Sulfa (sulfonamide antibiotics) Hives and Itching 05/07/2008    Sulfa dyne Hives 10/11/2024       Prescriptions Prior to Admission[1]     Medical History[2]    Surgical History[3]     reports that she has quit smoking. Her smoking use included cigarettes. She has never used smokeless tobacco. She reports current alcohol use. She reports that she does not use drugs.    Review of Systems  ROS negative with the exception as noted on HPI                                Objective    Vitals:    04/17/25 1151   BP: 117/77   BP Location: Left arm   Pulse: 70   Resp: 18   Temp: 36.8 °C (98.3 °F)   SpO2: 98%   Weight: 112 kg (248 lb)   Height: 1.753 m (5' 9\")     No LMP recorded. Patient has had a hysterectomy.    Physical Exam  Constitutional:       Appearance: Normal appearance.   HENT:      Head: Normocephalic and atraumatic.      Right Ear: Tympanic membrane, ear canal and external ear normal.      Left Ear: Tympanic membrane, ear canal and external ear normal.      Nose: Mucosal edema (and erythema) present. No rhinorrhea.      Right Sinus: No maxillary sinus tenderness or frontal " sinus tenderness.      Left Sinus: No maxillary sinus tenderness or frontal sinus tenderness.      Mouth/Throat:      Lips: Pink.      Mouth: Mucous membranes are moist. No oral lesions.      Dentition: Normal dentition. No gingival swelling.      Tongue: No lesions. Tongue does not deviate from midline.      Palate: No mass and lesions.      Pharynx: Posterior oropharyngeal erythema and postnasal drip present. No pharyngeal swelling or uvula swelling.   Cardiovascular:      Rate and Rhythm: Normal rate and regular rhythm.      Heart sounds: No murmur heard.  Pulmonary:      Effort: Pulmonary effort is normal. No respiratory distress.      Breath sounds: Normal breath sounds. No stridor. No wheezing, rhonchi or rales.   Abdominal:      General: Bowel sounds are normal. There is no distension.      Palpations: Abdomen is soft. There is no mass.      Tenderness: There is no abdominal tenderness. There is no right CVA tenderness, left CVA tenderness or guarding.   Lymphadenopathy:      Cervical: Cervical adenopathy present.   Neurological:      Mental Status: She is alert.         Procedures    Point of Care Test & Imaging Results from this visit  Results for orders placed or performed in visit on 04/17/25   POCT UA Automated manually resulted   Result Value Ref Range    POC Color, Urine Yellow Straw, Yellow, Light-Yellow    POC Appearance, Urine Clear Clear    POC Glucose, Urine NEGATIVE NEGATIVE mg/dl    POC Bilirubin, Urine SMALL (1+) (A) NEGATIVE    POC Ketones, Urine NEGATIVE NEGATIVE mg/dl    POC Specific Gravity, Urine >=1.030 1.005 - 1.035    POC Blood, Urine NEGATIVE NEGATIVE    POC PH, Urine 6.0 No Reference Range Established PH    POC Protein, Urine NEGATIVE NEGATIVE mg/dl    POC Urobilinogen, Urine 0.2 0.2, 1.0 EU/DL    Poc Nitrite, Urine NEGATIVE NEGATIVE    POC Leukocytes, Urine NEGATIVE NEGATIVE      Imaging  No results found.    Cardiology, Vascular, and Other Imaging  No other imaging results found for  the past 2 days      Diagnostic study results (if any) were reviewed by Geovanna Palomino PA-C.    Assessment/Plan   Allergies, medications, history, and pertinent labs/EKGs/Imaging reviewed by Geovanna Palomino PA-C.   multiple episodes of watery diarrhea with associated intermittent abdominal cramping, chills, rhinorrhea, sneezing, dry cough, nausea, dysuria ongoing for the past 3 days.   Point-of-care testing is negative. Tessalon Perles started for cough.    Stool testing ordered.   Advised to use Imodium as needed for diarrhea. clear liquid diet is recommended for the next 12-24 hour such as water, flat clear soda, electrolyte drinks, bone broth, chicken broth. If tolerated pt. May advance to light diet such as B.R.A.T diet adding dairy and meats back into diet last and as tolerated. Avoid high fat, high sugar, acidic foods. If pt. Is unable to tolerate clear liquid diet proceed to ED for potential IV rehydration.  Risk, benefits, and potential side effects of medication(s) discussed with pt. Discussed disease/illness presentation, treatment options, progression, complications, and outcomes with patient. Pt. Has expressed understanding and is an agreement of plan of care.   Medical Decision Making      Orders and Diagnoses  Diagnoses and all orders for this visit:  Dysuria  -     POCT UA Automated manually resulted  Suspected COVID-19 virus infection  -     POCT SPOTFIRE R/ST Panel Mini w/COVID ([facility] [place]) manually resulted  Acute cough  -     POCT SPOTFIRE R/ST Panel Mini w/COVID ([facility] [place]) manually resulted      Medical Admin Record      Patient disposition: Home    Electronically signed by Geovanna Palomino PA-C  12:22 PM           [1] (Not in a hospital admission)   [2]   Past Medical History:  Diagnosis Date    Recurrent UTI 11/13/2017    Type 2 diabetes mellitus without complication, without long-term current use of insulin 09/15/2020   [3]   Past Surgical History:  Procedure Laterality Date     HYSTERECTOMY N/A     supracervical with oophorectomy

## 2025-04-21 LAB — C DIFF TOX GENS STL QL NAA+PROBE: NOT DETECTED

## 2025-04-22 LAB
C COLI+JEJUNI+LARI FUSA STL QL NAA+PROBE: NOT DETECTED
EC STX1 GENE STL QL NAA+PROBE: NOT DETECTED
EC STX2 GENE STL QL NAA+PROBE: NOT DETECTED
NOROV GI+II ORF1-ORF2 JNC STL QL NAA+PR: NOT DETECTED
RVA NSP5 STL QL NAA+PROBE: DETECTED
SALMONELLA SP RPOD STL QL NAA+PROBE: NOT DETECTED
SHIGELLA DNA SPEC QL NAA+PROBE: NOT DETECTED
V CHOL+PARA RFBL+TRKH+TNAA STL QL NAA+PR: NOT DETECTED
Y ENTERO RECN STL QL NAA+PROBE: NOT DETECTED

## 2025-05-14 PROCEDURE — RXMED WILLOW AMBULATORY MEDICATION CHARGE

## 2025-05-16 ENCOUNTER — PHARMACY VISIT (OUTPATIENT)
Dept: PHARMACY | Facility: CLINIC | Age: 55
End: 2025-05-16
Payer: COMMERCIAL

## 2025-05-21 ENCOUNTER — OFFICE VISIT (OUTPATIENT)
Dept: OTOLARYNGOLOGY | Facility: CLINIC | Age: 55
End: 2025-05-21
Payer: COMMERCIAL

## 2025-05-21 VITALS
WEIGHT: 211 LBS | OXYGEN SATURATION: 99 % | RESPIRATION RATE: 16 BRPM | HEART RATE: 87 BPM | BODY MASS INDEX: 31.98 KG/M2 | DIASTOLIC BLOOD PRESSURE: 75 MMHG | SYSTOLIC BLOOD PRESSURE: 125 MMHG | HEIGHT: 68 IN | TEMPERATURE: 98.9 F

## 2025-05-21 DIAGNOSIS — G93.2 IIH (IDIOPATHIC INTRACRANIAL HYPERTENSION): ICD-10-CM

## 2025-05-21 DIAGNOSIS — H93.A1 PULSATILE TINNITUS OF RIGHT EAR: Primary | ICD-10-CM

## 2025-05-21 PROCEDURE — 99204 OFFICE O/P NEW MOD 45 MIN: CPT | Performed by: NURSE PRACTITIONER

## 2025-05-21 PROCEDURE — 3078F DIAST BP <80 MM HG: CPT | Performed by: NURSE PRACTITIONER

## 2025-05-21 PROCEDURE — 99214 OFFICE O/P EST MOD 30 MIN: CPT | Performed by: NURSE PRACTITIONER

## 2025-05-21 PROCEDURE — 3008F BODY MASS INDEX DOCD: CPT | Performed by: NURSE PRACTITIONER

## 2025-05-21 PROCEDURE — 1036F TOBACCO NON-USER: CPT | Performed by: NURSE PRACTITIONER

## 2025-05-21 PROCEDURE — 3074F SYST BP LT 130 MM HG: CPT | Performed by: NURSE PRACTITIONER

## 2025-05-21 SDOH — ECONOMIC STABILITY: FOOD INSECURITY: WITHIN THE PAST 12 MONTHS, THE FOOD YOU BOUGHT JUST DIDN'T LAST AND YOU DIDN'T HAVE MONEY TO GET MORE.: NEVER TRUE

## 2025-05-21 SDOH — ECONOMIC STABILITY: FOOD INSECURITY: WITHIN THE PAST 12 MONTHS, YOU WORRIED THAT YOUR FOOD WOULD RUN OUT BEFORE YOU GOT MONEY TO BUY MORE.: NEVER TRUE

## 2025-05-21 ASSESSMENT — LIFESTYLE VARIABLES
SKIP TO QUESTIONS 9-10: 1
HOW MANY STANDARD DRINKS CONTAINING ALCOHOL DO YOU HAVE ON A TYPICAL DAY: PATIENT DOES NOT DRINK
AUDIT-C TOTAL SCORE: 1
HOW OFTEN DO YOU HAVE A DRINK CONTAINING ALCOHOL: MONTHLY OR LESS
HOW OFTEN DO YOU HAVE SIX OR MORE DRINKS ON ONE OCCASION: NEVER

## 2025-05-21 ASSESSMENT — PAIN SCALES - GENERAL: PAINLEVEL_OUTOF10: 0-NO PAIN

## 2025-05-21 ASSESSMENT — ENCOUNTER SYMPTOMS
OCCASIONAL FEELINGS OF UNSTEADINESS: 0
DEPRESSION: 0
LOSS OF SENSATION IN FEET: 0

## 2025-05-21 NOTE — PROGRESS NOTES
Subjective   Patient ID: Camryn Durant is a 55 y.o. female who presents for pulsatile tinnitus in the right ear.  HPI    Patient states that she noticed the pulsation sound about a year ago. It is mostly in the right ear. It is constant. No vertigo. She had a work up at [facility]. Her MRI brain showed IIH. She then followed up with Neurology at [facility]. Currently taking Acetazolamide. She states that since she changed job to , she has not been able to follow up with her Neuro at [facility]. States that her  insurance does not work there.  So, she has tried to schedule  with Neurology here at  but was told that she will not be seen until September 2025.  She was trying to be seen sooner therefore her PCP referred her to ENT.    She comes to clinic without hearing test.     MR brain w and wo IV contrast  Order: [id number]  Narrative    EXAMINATION: MR HEAD W/+W/O 01/17/2024 08:18 AM  CLINICAL HISTORY: Tinnitus, pulsatile  ASSOCIATED DIAGNOSIS: Pulsatile tinnitus  ORDERING PROVIDER: ROSEMARY PIMENTEL  TECHNOLOGISTS NOTE:  12 of 20cc injected, vein blew, no re-stick  COMPARISON: None  TECHNIQUE: Patient questionnaire was completed, and was reviewed by MRI personnel prior to the patient entering the scanner. Multiplanar, multisequence MR imaging of the head was performed with and without intravenous contrast.  INTRA-PROCEDURE MEDS: Gadoterate Meglumine (DOTAREM) 10 MMOL/20ML solution 20 mL Route: Intravenous Push    FINDINGS:    Mild motion degraded exam.    Skull base: No evidence of a mass at the caroticojugular spines, temporal bones, posterior fossa or internal auditory canals.    Jugular: No high riding jugular corbin or jugular diverticulum.    Signs of intracranial hypertension: Partially empty sella. There is prominent subarachnoid space surrounding the bilateral optic nerves. The right transverse and sigmoid sinuses are dominant. The left transverse and sigmoid sinuses are hypoplastic. However there is a  prominent arachnoid granulation in the lateral aspect of the left transverse sinus causing severe stenosis.    Cerebellar tonsils: Normal.    Brain parenchyma: No acute infarct or hemorrhage. No mass effect or herniation. White matter is within normal limits for age.    Ventricles/extra-axial spaces: Normal.    Extracranial structures: Paranasal sinuses are clear. Mastoid air cells are clear. Normal marrow signal.    IMPRESSION:  Constellation of findings as described above are suspicious for idiopathic intracranial hypertension. Neurology consultation and further evaluation recommended.         Objective   Physical Exam    CONSTITUTIONAL: No acute distress, normal facial features; No fever; no chills  VOICE: No hoarseness or other audible abnormality  RESPIRATION: Breathing comfortably, no stridor; normal breathing effort  CV: No cyanosis visible on the face and neck area  EYES:Pupils equal and round ; no erythema; conjunctiva clear; sclera white  NEURO: Alert and oriented, able to raise eyebrows symmetrical bilateral, smile with no facial droop, able to swallow  HEAD AND FACE: Symmetric facial features, no masses or lesions    Right ear examination: External ear normal. EAC is clear. TM intact without effusion, retraction or perforation.   Left ear examination: External ear normal. EAC is clear. TM intact without effusion, retraction or perforation.     NOSE: External nose midline; inferior nasal turbinates normal no mucopus or polyps.   ORAL CAVITY: No lesions of external lips; uvula is midline; tongue with good mobility; no gross mass in oral cavity; mucosa appears pink   NECK/LYMPH: No obvious deformity or lesions; trachea is midline  PSYCH: Alert and oriented with appropriate mood and affect.      Assessment/Plan     1. Pulsatile tinnitus of right ear        2. IIH (idiopathic intracranial hypertension)          Her clinical exam today showed no evidence of glomus or vascular anomaly in the middle ear. Both  EACs are clear and no evidence of middle ear effusion .  We discussed various etiology of pulsatile tinnitus including Chiari Malformation, Empty Sella, IIH, Prominent jugular bulb, glomus jugulare and tympanicum, SCCD.  Since patient already had MRI Brain which showed suspicious findings of IIH, this could very well be the cause of her pulsatile tinnitus. We discussed that ultimately the management of this case is through Neurology. I will assist in getting her an appointment sooner. From ENT standpoint, we can do initial work up with audiogram. I will call and discuss results once completed.              MARIELENA Winters-CNP 05/21/25 3:14 PM

## 2025-05-28 ENCOUNTER — APPOINTMENT (OUTPATIENT)
Dept: AUDIOLOGY | Facility: CLINIC | Age: 55
End: 2025-05-28
Payer: COMMERCIAL

## 2025-08-08 ENCOUNTER — APPOINTMENT (OUTPATIENT)
Dept: PRIMARY CARE | Facility: CLINIC | Age: 55
End: 2025-08-08
Payer: COMMERCIAL

## 2025-08-08 ENCOUNTER — APPOINTMENT (OUTPATIENT)
Dept: CARDIOLOGY | Facility: CLINIC | Age: 55
End: 2025-08-08
Payer: COMMERCIAL

## 2025-08-08 VITALS
WEIGHT: 215 LBS | HEIGHT: 68 IN | BODY MASS INDEX: 32.58 KG/M2 | HEART RATE: 75 BPM | SYSTOLIC BLOOD PRESSURE: 112 MMHG | DIASTOLIC BLOOD PRESSURE: 78 MMHG

## 2025-08-08 VITALS
SYSTOLIC BLOOD PRESSURE: 112 MMHG | DIASTOLIC BLOOD PRESSURE: 78 MMHG | HEART RATE: 75 BPM | BODY MASS INDEX: 32.58 KG/M2 | HEIGHT: 68 IN | WEIGHT: 215 LBS

## 2025-08-08 DIAGNOSIS — R73.9 BLOOD GLUCOSE ELEVATED: ICD-10-CM

## 2025-08-08 DIAGNOSIS — Z13.1 SCREENING FOR DIABETES MELLITUS: ICD-10-CM

## 2025-08-08 DIAGNOSIS — E66.811 CLASS 1 OBESITY DUE TO EXCESS CALORIES WITH SERIOUS COMORBIDITY AND BODY MASS INDEX (BMI) OF 32.0 TO 32.9 IN ADULT: ICD-10-CM

## 2025-08-08 DIAGNOSIS — E78.5 HYPERLIPIDEMIA, UNSPECIFIED HYPERLIPIDEMIA TYPE: ICD-10-CM

## 2025-08-08 DIAGNOSIS — R00.2 PALPITATIONS: ICD-10-CM

## 2025-08-08 DIAGNOSIS — G47.33 OSA (OBSTRUCTIVE SLEEP APNEA): ICD-10-CM

## 2025-08-08 DIAGNOSIS — E78.2 MIXED HYPERLIPIDEMIA: ICD-10-CM

## 2025-08-08 DIAGNOSIS — E55.9 VITAMIN D INSUFFICIENCY: ICD-10-CM

## 2025-08-08 DIAGNOSIS — R07.89 ATYPICAL CHEST PAIN: ICD-10-CM

## 2025-08-08 DIAGNOSIS — E66.09 CLASS 1 OBESITY DUE TO EXCESS CALORIES WITH SERIOUS COMORBIDITY AND BODY MASS INDEX (BMI) OF 32.0 TO 32.9 IN ADULT: ICD-10-CM

## 2025-08-08 DIAGNOSIS — Z13.89 SCREENING FOR OBESITY: ICD-10-CM

## 2025-08-08 DIAGNOSIS — R94.31 ABNORMAL ELECTROCARDIOGRAM (ECG) (EKG): ICD-10-CM

## 2025-08-08 DIAGNOSIS — I10 PRIMARY HYPERTENSION: Primary | ICD-10-CM

## 2025-08-08 DIAGNOSIS — Z78.9 NON-SMOKER: ICD-10-CM

## 2025-08-08 DIAGNOSIS — I10 PRIMARY HYPERTENSION: ICD-10-CM

## 2025-08-08 DIAGNOSIS — R00.2 PALPITATIONS: Primary | ICD-10-CM

## 2025-08-08 DIAGNOSIS — E78.00 PURE HYPERCHOLESTEROLEMIA, UNSPECIFIED: ICD-10-CM

## 2025-08-08 DIAGNOSIS — R53.82 CHRONIC FATIGUE: ICD-10-CM

## 2025-08-08 DIAGNOSIS — J30.2 SEASONAL ALLERGIES: ICD-10-CM

## 2025-08-08 PROCEDURE — 99215 OFFICE O/P EST HI 40 MIN: CPT | Performed by: INTERNAL MEDICINE

## 2025-08-08 PROCEDURE — 3078F DIAST BP <80 MM HG: CPT | Performed by: INTERNAL MEDICINE

## 2025-08-08 PROCEDURE — RXMED WILLOW AMBULATORY MEDICATION CHARGE

## 2025-08-08 PROCEDURE — 3074F SYST BP LT 130 MM HG: CPT | Performed by: INTERNAL MEDICINE

## 2025-08-08 PROCEDURE — 3008F BODY MASS INDEX DOCD: CPT | Performed by: INTERNAL MEDICINE

## 2025-08-08 ASSESSMENT — ENCOUNTER SYMPTOMS
GASTROINTESTINAL NEGATIVE: 1
WEAKNESS: 0
NUMBNESS: 1
CONSTITUTIONAL NEGATIVE: 1
LIGHT-HEADEDNESS: 0
DIZZINESS: 1
NERVOUS/ANXIOUS: 1
PALPITATIONS: 1
SHORTNESS OF BREATH: 0

## 2025-08-08 NOTE — PROGRESS NOTES
"CARDIOLOGY NEW PATIENT OFFICE VISIT    Patient:  Camryn Durant  YOB: 1970  MRN: [id number]       Chief Complaint/Active Symptoms:       Camryn Durant is a 55 y.o. female who is being seen today at the request of Dr. Messer for evaluation of left arm weakness and palpitations..    Chief Complaint   Patient presents with    [facility]     Switching care from [facility] for HTN       History of Present Illness:   HPI    Here for evaluation of palpitations. Has pulsatile tinnitus so she is always aware of her heart beat. Feels her heart have a skip or pause. Happens every day but sometimes its a little bit stronger. Has more when she is working or she is a little stressed out. Also notices that her heart beat is a little faster. It bothers her that it doesn't go away. Has chronic dizziness but no syncope or near syncope associated with these. Can have a pain in her head with these episodes. No shortness of breath, nausea or vomiting.     Has episodes where she feels her heart doesn't \"feel well\". Struggles with intermittent left jaw pain (associated with the palpitations). Had normal stress echo 12/2022. Had prior heart catheterization which was \"normal\" in 2015 done at [facility] which is now closed.     Allergies:     Allergies[1]     Outpatient Medications:     Current Outpatient Medications   Medication Instructions    acetaZOLAMIDE (DIAMOX) 500 mg, 2 times daily    amLODIPine (NORVASC) 10 mg, oral, Daily    ammonium lactate (Lac-Hydrin) 12 % lotion Apply topically thin layer to affected area 2x daily.    atorvastatin (LIPITOR) 20 mg, oral, Daily    cetirizine (ZYRTEC) 10 mg, Daily RT    Deep Sea Nasal 0.65 % nasal spray 1 spray    fluticasone (Flonase) 50 mcg/actuation nasal spray 1 spray, Daily RT    hydrOXYzine HCL (ATARAX) 50 mg, oral, Every 8 hours PRN    meclizine (ANTIVERT) 25 mg    trazodone HCl (TRAZODONE ORAL) Take by mouth.        Past Medical History:     Medical History[2]    Social History: "     Social History[3]    Family History:      Family History[4]    Review of Systems:     Review of Systems   Constitutional: Negative.    HENT:  Positive for hearing loss.    Respiratory:  Negative for shortness of breath.    Cardiovascular:  Positive for chest pain and palpitations. Negative for leg swelling.   Gastrointestinal: Negative.    Genitourinary: Negative.    Neurological:  Positive for dizziness and numbness. Negative for syncope, weakness and light-headedness.   Psychiatric/Behavioral:  The patient is nervous/anxious.    All other systems reviewed and are negative.      Objective:     Vitals:    08/08/25 0811   BP: 112/78   Pulse: 75     /78 (08/08/25 0811)    Temp      Pulse 75 (08/08/25 0811)   Resp      SpO2          Vitals:    08/08/25 0811   Weight: 97.5 kg (215 lb)       Physical Examination:   GENERAL:  Well developed, well nourished, in no acute distress.  HEENT: NC AT EOMI with anicteric sclera  NECK:  Supple, no JVD, no bruit.  CHEST:  Symmetric and nontender.  LUNGS:  Normal respiratory effort. Bilateral breath sounds clear to auscultation.   HEART:  PMI nondisplaced. Rate and rhythm regular with no evident murmur, no gallop appreciated. There are no rubs, clicks or heaves.  PERIPHERAL VASCULAR:  Pulses present and equally palpable; 2+ throughout.  ABDOMEN: Soft, NT, ND without HSM or palpable organomegaly, no bruits, normoactive bowel sounds  MUSCULOSKELETAL: No significant joint or limb deformity  EXTREMITIES:  Warm with good color, no clubbing or cyanosis.  There is no edema noted.  NEURO/PSYCH:  Alert and oriented times three with approppriate behavior and responses.  LYMPH: no significant palpable lymphadenopathy  SKIN: No rashes on exposed skin, no reported skin lesions.     Lab:     CBC:   Lab Results   Component Value Date    WBC 4.6 10/11/2024    RBC 4.19 10/11/2024    HGB 12.9 10/11/2024    HCT 38.1 10/11/2024     10/11/2024      Lab Results   Component Value Date     "WBC 4.6 10/11/2024    RBC 4.19 10/11/2024    HGB 12.9 10/11/2024    HCT 38.1 10/11/2024    MCV 91 10/11/2024    MCH 30.8 10/11/2024    MCHC 33.9 10/11/2024    RDW 14.6 (H) 10/11/2024     10/11/2024       CMP:    Lab Results   Component Value Date     10/11/2024    K 3.7 10/11/2024     (H) 10/11/2024    CO2 23 10/11/2024    BUN 16 10/11/2024    CREATININE 0.77 10/11/2024    GLUCOSE 101 (H) 10/11/2024    CALCIUM 9.1 10/11/2024     Lab Results   Component Value Date     10/11/2024    K 3.7 10/11/2024     (H) 10/11/2024    CO2 23 10/11/2024    BUN 16 10/11/2024    CREATININE 0.77 10/11/2024     Lab Results   Component Value Date    ALKPHOS 139 (H) 10/11/2024    ALT 16 10/11/2024    AST 15 10/11/2024    PROT 7.0 10/11/2024    BILITOT 0.6 10/11/2024       Magnesium:    No results found for: \"MG\"    Lipid Profile:    Lab Results   Component Value Date    TRIG 132 10/11/2024    HDL 54.9 10/11/2024    LDLCALC 161 (H) 10/11/2024       TSH:    Lab Results   Component Value Date    TSH 1.38 10/11/2024       BNP:   No results found for: \"BNP\"     PT/INR:    No results found for: \"PROTIME\", \"INR\"    HgBA1c:    Lab Results   Component Value Date    HGBA1C 5.4 10/11/2024       Cardiac Enzymes:    No results found for: \"TROPHS\"      Diagnostic Studies:     No echocardiogram results found for the past 12 months    No nuclear medicine results found for the past 12 months    No valid procedures specified.  EKG today - NSR, inc RBBB, voltage criteria for LVH, NS diffuse T abn.     Radiology:     No valid procedures specified.    Assessment:     Problem List Items Addressed This Visit       CHRISTINE (obstructive sleep apnea)    Primary hypertension    Relevant Orders    ECG 12 lead (Clinic Performed) (Completed)    CT cardiac scoring wo IV contrast    Transthoracic Echo Complete    Follow Up In Cardiology    Palpitations - Primary    Relevant Orders    Holter Or Event Cardiac Monitor    Transthoracic Echo " Complete    Follow Up In Cardiology    Atypical chest pain    Relevant Orders    Follow Up In Cardiology    Pure hypercholesterolemia, unspecified     Other Visit Diagnoses         Mixed hyperlipidemia          Abnormal electrocardiogram (ECG) (EKG)        Relevant Orders    Transthoracic Echo Complete    Follow Up In Cardiology            Plan:   1.  Palpitations.  Palpitations sound mostly like PVCs.  Not unusual.  For now we will do a Holter monitor to see and confirm whether her symptoms are PACs or PVCs and their volume.  Will also check an echocardiogram to make sure that her heart is structurally normal along with some of her other symptoms.  Depending on the results of the Holter monitor we can discuss adding beta-blocker therapy.  However, as I discussed with the patient, it is very important for her palpitations that she be compliant with the treatment for her sleep apnea.  2.  Chest pain.  Her chest discomfort is associated with the palpitations which is why I think these are more likely to be PVCs than premature atrial beats. It is not highly suggestive of angina other than in its quality.  Timing duration and other features are atypical.  Because of her cardiovascular risk I have suggested a CT cardiac score.  The patient had a heart catheterization 10 years ago that was normal and a stress test 2-1/2 years ago that was low risk.  Unless her CT cardiac score is very high risk I would not perform testing at this time.  Will continue with risk factor modification.  3.  Hypertension.  The patient thinks that because she is on the same medication of this her mother for hypertension maybe she needs a different medication.  I explained to the patient that it is working in controlling her blood pressure and for that reason alone does not need to be changed.  4.  Hypercholesterolemia.  Patient's LDL cholesterol in the fall was 161.  Depending on her CT cardiac score results will determine how aggressive we are  at lipid-lowering will wait for that for deciding statin therapy.  5.  Abnormal EKG.  She has voltage criteria for left ventricular hypertrophy and an incomplete right bundle branch block.  The echocardiogram will address any specific abnormalities.    Will see her back in the office after her testing.  The frequency of follow-up and any changes in her therapy will await the results of this test.  Right now her symptoms appear to be benign and there is no heart failure nothing to suggest an acute coronary syndrome and while she has the palpitations the frequency as she describes it is also benign sounding.  She has any change she is encouraged to contact us regarding those changes.         [1]   Allergies  Allergen Reactions    Sulfa (Sulfonamide Antibiotics) Hives and Itching    Sulfa Dyne Hives   [2]   Past Medical History:  Diagnosis Date    Recurrent UTI 11/13/2017    Type 2 diabetes mellitus without complication, without long-term current use of insulin 09/15/2020   [3]   Social History  Socioeconomic History    Marital status: Single   Tobacco Use    Smoking status: Former     Types: Cigarettes    Smokeless tobacco: Never   Substance and Sexual Activity    Alcohol use: Yes     Comment: occasional beers    Drug use: Never     Social Drivers of Health     Financial Resource Strain: Medium Risk (7/8/2020)    Received from [facility]    Overall Financial Resource Strain (CARDIA)     Difficulty of Paying Living Expenses: Somewhat hard   Food Insecurity: No Food Insecurity (5/21/2025)    Hunger Vital Sign     Worried About Running Out of Food in the Last Year: Never true     Ran Out of Food in the Last Year: Never true   Transportation Needs: No Transportation Needs (7/8/2020)    Received from [facility]    PRAPARE - Transportation     Lack of Transportation (Medical): No     Lack of Transportation (Non-Medical): No   [4] No family history on file.

## 2025-08-08 NOTE — PROGRESS NOTES
Subjective   Patient ID: Camryn Durant is a 55 y.o. female who presents for Establish Care.    HPI Pt is a pleasant 55 AAF who was seen and evaluated during the OV. Pt reports PMHX of HTN, HLD, low vitamin D level, CHRISTINE on CPAP, chronic fatigue, hx of palpitation, seasonal allergy, anxiety, IIH, PT, Pt would like to have the detailed evaluation of all her sxs. Pt was seen by the cardiology team earlier today and will have the extensive evaluation with that team. During the clinical encounter pt denies fever, chills, no SOB, no chest pain/tightness, no abdominal pain, no N/V/D reported, no change of urination reported. Pt denies any other health concerns during this visit.  Sohx: non smoker  List of the medications and allergies: reviewed  Fhx and PMHx: reviewed      Review of Systems  The systems have been reviewed as follows:   Constitutional: no fever, no chills, reports chronic fatigue  Eyes: no eyesight problems, no eye redness, no eye pain, no blurred vision  ENT: no ear pain or sore throat, no nasal discharge or congestion, no sinus pressure, no hearing loss  Cardiovascular: no chest pain or tightness, no SOB, but as mentioned at HPI  Respiratory: no cough, no dyspnea with exertion or with rest, no wheezing  Gastrointestinal: no abdominal pain, no constipation or diarrhea, no heartburn, no nausea or vomiting  Genitourinary: no urine frequency, no dysuria, no urinary urgency, no urinary incontinence or retention  Musculoskeletal: no back pain, no arthralgia, no joint swelling or stiffness, no muscle weakness  Integumentary: no skin lesions or rash  Neurological: no headaches, no dizziness or fainting, no limb weakness  Psychiatric: no mood changes, no anxiety or depression, no SI/HI  Endocrine: no weight change, no temperature intolerance, no change of appetite  Hematologic/Lymphatic: no easy bruising or bleeding    Objective   /78 (BP Location: Left arm, Patient Position: Sitting)   Pulse 75   Ht 1.727  "m (5' 8\")   Wt 97.5 kg (215 lb)   BMI 32.69 kg/m²     Physical Exam  Constitutional: well hydrated, well nourished, no acute distress. Vital signs reviewed  Head and face: normocephalic, atraumatic  Eyes: no erythema, edema or visible abnormalities of conjunctiva or lids. Pupils are equal, round and reactive to light. Extraocular movement normal  ENT: external ears without deformities, external ear canals without redness, swelling or tenderness. TMs normal color, normal landmarks, no fluid, non-retracted  Neck: full ROM, no adenopathy, neck was supple, thyroid gland not enlarged, no palpable nodules  Pulmonary: normal respiratory rate and effort. Lungs are clear to auscultation, no rales,no rhonchi or wheezing  Cardiovascular: RRR, normal S1 and S2, no murmur, no gallop, posterior tib. pulse normal 2+ bilaterally, no lower extremity edema  Abdomen: soft, non tender on palpation, not distended, normal BS in all 4 quadrants  Musculoskeletal: no joint swelling, normal movements of all 4 extremities. Gait and station: normal, Digits and nails: normal without clubbing  Skin: normal color, no rash  Neurologic: Cranial nerves: CN II: visual acuity intact. Source: acuity reported by patient. Pupils reactive to light with normal accommodation. Right and left pupil normal CN III, IV and VI: the EO movements were intact. CN VIII: no hearing loss. Deep tendon reflexes: were 2+ and symmetric: R and L patella.  Sensory exam: normal to light touch  Psychiatric: Mood and affect: normal, Alert and Oriented x 3  Lymphatic: no cervical lymphadenopathy  Assessment/Plan   HTN: well controlled on the current dose of the amlodipine  Will check CBC, CMP, TSH/T4 level U prot/Cr/Alb level  HLD: on atorvastatin 20 mg PO daily. Will check lipid panel  Chronic fatigue, Hx of CHRISTINE on CPAP. Pt did not see the sleep medicine team for the years and has the issue with the CPAP adjustment. Will refer for the sleep team re evaluation. Will also check " BW as mentioned + CRP and ESR. Vitamin D level  since the pt was insufficient  Hx of heart palpation: BW as mentioned + Mg level  Elevated BG level: will screen for DM  Seasonal allergy: will provide the referral for the allergy team evaluation  BMI of 32 consistent with obesity WHO class I; Pt will be beneficial form the lifestyle modifications    A comprehensive discussion regarding all presenting signs and symptoms was conducted with the patient. The patient demonstrated understanding of the diagnosed health condition and expressed agreement with the proposed clinical management plan as outlined above. The patient is advised to follow up with their primary care provider (PCP) as scheduled, or earlier if clinically indicated.

## 2025-08-08 NOTE — PATIENT INSTRUCTIONS
Wear CPAP consistently    Get CT cardiac scan  48 hour monitor  Echo    Will see you back after testing.

## 2025-08-09 LAB
25(OH)D3+25(OH)D2 SERPL-MCNC: 29 NG/ML (ref 30–100)
ALBUMIN SERPL-MCNC: 4.2 G/DL (ref 3.6–5.1)
ALBUMIN/CREAT UR: 3 MG/G CREAT
ALP SERPL-CCNC: 138 U/L (ref 37–153)
ALT SERPL-CCNC: 17 U/L (ref 6–29)
ANION GAP SERPL CALCULATED.4IONS-SCNC: 9 MMOL/L (CALC) (ref 7–17)
AST SERPL-CCNC: 16 U/L (ref 10–35)
BASOPHILS # BLD AUTO: 42 CELLS/UL (ref 0–200)
BASOPHILS NFR BLD AUTO: 0.8 %
BILIRUB SERPL-MCNC: 0.4 MG/DL (ref 0.2–1.2)
BUN SERPL-MCNC: 15 MG/DL (ref 7–25)
CALCIUM SERPL-MCNC: 9.4 MG/DL (ref 8.6–10.4)
CHLORIDE SERPL-SCNC: 110 MMOL/L (ref 98–110)
CHOLEST SERPL-MCNC: 255 MG/DL
CHOLEST/HDLC SERPL: 3.5 (CALC)
CO2 SERPL-SCNC: 23 MMOL/L (ref 20–32)
CREAT SERPL-MCNC: 0.79 MG/DL (ref 0.5–1.03)
CREAT UR-MCNC: 108 MG/DL (ref 20–275)
CREAT UR-MCNC: 108 MG/DL (ref 20–275)
CRP SERPL-MCNC: NORMAL MG/L
EGFRCR SERPLBLD CKD-EPI 2021: 87 ML/MIN/1.73M2
EOSINOPHIL # BLD AUTO: 109 CELLS/UL (ref 15–500)
EOSINOPHIL NFR BLD AUTO: 2.1 %
ERYTHROCYTE [DISTWIDTH] IN BLOOD BY AUTOMATED COUNT: 13.2 % (ref 11–15)
ERYTHROCYTE [SEDIMENTATION RATE] IN BLOOD BY WESTERGREN METHOD: 6 MM/H
EST. AVERAGE GLUCOSE BLD GHB EST-MCNC: 123 MG/DL
EST. AVERAGE GLUCOSE BLD GHB EST-SCNC: 6.8 MMOL/L
GLUCOSE SERPL-MCNC: 98 MG/DL (ref 65–99)
HBA1C MFR BLD: 5.9 %
HCT VFR BLD AUTO: 39 % (ref 35–45)
HDLC SERPL-MCNC: 73 MG/DL
HGB BLD-MCNC: 12.8 G/DL (ref 11.7–15.5)
LDLC SERPL CALC-MCNC: 156 MG/DL (CALC)
LYMPHOCYTES # BLD AUTO: 2574 CELLS/UL (ref 850–3900)
LYMPHOCYTES NFR BLD AUTO: 49.5 %
MAGNESIUM SERPL-MCNC: 2.5 MG/DL (ref 1.5–2.5)
MCH RBC QN AUTO: 30.6 PG (ref 27–33)
MCHC RBC AUTO-ENTMCNC: 32.8 G/DL (ref 32–36)
MCV RBC AUTO: 93.3 FL (ref 80–100)
MICROALBUMIN UR-MCNC: 0.3 MG/DL
MONOCYTES # BLD AUTO: 348 CELLS/UL (ref 200–950)
MONOCYTES NFR BLD AUTO: 6.7 %
NEUTROPHILS # BLD AUTO: 2127 CELLS/UL (ref 1500–7800)
NEUTROPHILS NFR BLD AUTO: 40.9 %
NONHDLC SERPL-MCNC: 182 MG/DL (CALC)
PLATELET # BLD AUTO: 254 THOUSAND/UL (ref 140–400)
PMV BLD REES-ECKER: 12 FL (ref 7.5–12.5)
POTASSIUM SERPL-SCNC: 4.1 MMOL/L (ref 3.5–5.3)
PROT SERPL-MCNC: 6.9 G/DL (ref 6.1–8.1)
PROT UR-MCNC: 5 MG/DL (ref 5–24)
PROT/CREAT UR: 0.05 MG/MG CREAT (ref 0.02–0.18)
PROT/CREAT UR: 46 MG/G CREAT (ref 24–184)
RBC # BLD AUTO: 4.18 MILLION/UL (ref 3.8–5.1)
SODIUM SERPL-SCNC: 142 MMOL/L (ref 135–146)
TRIGL SERPL-MCNC: 134 MG/DL
TSH SERPL-ACNC: 0.96 MIU/L (ref 0.4–4.5)
WBC # BLD AUTO: 5.2 THOUSAND/UL (ref 3.8–10.8)

## 2025-08-11 ENCOUNTER — RESULTS FOLLOW-UP (OUTPATIENT)
Dept: CARDIOLOGY | Facility: CLINIC | Age: 55
End: 2025-08-11
Payer: COMMERCIAL

## 2025-08-11 LAB
25(OH)D3+25(OH)D2 SERPL-MCNC: 29 NG/ML (ref 30–100)
ALBUMIN SERPL-MCNC: 4.2 G/DL (ref 3.6–5.1)
ALBUMIN/CREAT UR: 3 MG/G CREAT
ALP SERPL-CCNC: 138 U/L (ref 37–153)
ALT SERPL-CCNC: 17 U/L (ref 6–29)
ANION GAP SERPL CALCULATED.4IONS-SCNC: 9 MMOL/L (CALC) (ref 7–17)
AST SERPL-CCNC: 16 U/L (ref 10–35)
BASOPHILS # BLD AUTO: 42 CELLS/UL (ref 0–200)
BASOPHILS NFR BLD AUTO: 0.8 %
BILIRUB SERPL-MCNC: 0.4 MG/DL (ref 0.2–1.2)
BUN SERPL-MCNC: 15 MG/DL (ref 7–25)
CALCIUM SERPL-MCNC: 9.4 MG/DL (ref 8.6–10.4)
CHLORIDE SERPL-SCNC: 110 MMOL/L (ref 98–110)
CHOLEST SERPL-MCNC: 255 MG/DL
CHOLEST/HDLC SERPL: 3.5 (CALC)
CO2 SERPL-SCNC: 23 MMOL/L (ref 20–32)
CREAT SERPL-MCNC: 0.79 MG/DL (ref 0.5–1.03)
CREAT UR-MCNC: 108 MG/DL (ref 20–275)
CREAT UR-MCNC: 108 MG/DL (ref 20–275)
CRP SERPL-MCNC: 4.7 MG/L
EGFRCR SERPLBLD CKD-EPI 2021: 87 ML/MIN/1.73M2
EOSINOPHIL # BLD AUTO: 109 CELLS/UL (ref 15–500)
EOSINOPHIL NFR BLD AUTO: 2.1 %
ERYTHROCYTE [DISTWIDTH] IN BLOOD BY AUTOMATED COUNT: 13.2 % (ref 11–15)
ERYTHROCYTE [SEDIMENTATION RATE] IN BLOOD BY WESTERGREN METHOD: 6 MM/H
EST. AVERAGE GLUCOSE BLD GHB EST-MCNC: 123 MG/DL
EST. AVERAGE GLUCOSE BLD GHB EST-SCNC: 6.8 MMOL/L
GLUCOSE SERPL-MCNC: 98 MG/DL (ref 65–99)
HBA1C MFR BLD: 5.9 %
HCT VFR BLD AUTO: 39 % (ref 35–45)
HDLC SERPL-MCNC: 73 MG/DL
HGB BLD-MCNC: 12.8 G/DL (ref 11.7–15.5)
LDLC SERPL CALC-MCNC: 156 MG/DL (CALC)
LYMPHOCYTES # BLD AUTO: 2574 CELLS/UL (ref 850–3900)
LYMPHOCYTES NFR BLD AUTO: 49.5 %
MAGNESIUM SERPL-MCNC: 2.5 MG/DL (ref 1.5–2.5)
MCH RBC QN AUTO: 30.6 PG (ref 27–33)
MCHC RBC AUTO-ENTMCNC: 32.8 G/DL (ref 32–36)
MCV RBC AUTO: 93.3 FL (ref 80–100)
MICROALBUMIN UR-MCNC: 0.3 MG/DL
MONOCYTES # BLD AUTO: 348 CELLS/UL (ref 200–950)
MONOCYTES NFR BLD AUTO: 6.7 %
NEUTROPHILS # BLD AUTO: 2127 CELLS/UL (ref 1500–7800)
NEUTROPHILS NFR BLD AUTO: 40.9 %
NONHDLC SERPL-MCNC: 182 MG/DL (CALC)
PLATELET # BLD AUTO: 254 THOUSAND/UL (ref 140–400)
PMV BLD REES-ECKER: 12 FL (ref 7.5–12.5)
POTASSIUM SERPL-SCNC: 4.1 MMOL/L (ref 3.5–5.3)
PROT SERPL-MCNC: 6.9 G/DL (ref 6.1–8.1)
PROT UR-MCNC: 5 MG/DL (ref 5–24)
PROT/CREAT UR: 0.05 MG/MG CREAT (ref 0.02–0.18)
PROT/CREAT UR: 46 MG/G CREAT (ref 24–184)
RBC # BLD AUTO: 4.18 MILLION/UL (ref 3.8–5.1)
SODIUM SERPL-SCNC: 142 MMOL/L (ref 135–146)
TRIGL SERPL-MCNC: 134 MG/DL
TSH SERPL-ACNC: 0.96 MIU/L (ref 0.4–4.5)
WBC # BLD AUTO: 5.2 THOUSAND/UL (ref 3.8–10.8)

## 2025-08-14 ENCOUNTER — PHARMACY VISIT (OUTPATIENT)
Dept: PHARMACY | Facility: CLINIC | Age: 55
End: 2025-08-14
Payer: COMMERCIAL

## 2025-08-21 ENCOUNTER — APPOINTMENT (OUTPATIENT)
Dept: PRIMARY CARE | Facility: CLINIC | Age: 55
End: 2025-08-21
Payer: COMMERCIAL

## 2025-09-04 ENCOUNTER — OFFICE VISIT (OUTPATIENT)
Dept: NEUROLOGY | Facility: CLINIC | Age: 55
End: 2025-09-04
Payer: COMMERCIAL

## 2025-09-04 VITALS — DIASTOLIC BLOOD PRESSURE: 74 MMHG | SYSTOLIC BLOOD PRESSURE: 113 MMHG | HEART RATE: 62 BPM

## 2025-09-04 DIAGNOSIS — G43.709 CHRONIC MIGRAINE W/O AURA W/O STATUS MIGRAINOSUS, NOT INTRACTABLE: ICD-10-CM

## 2025-09-04 DIAGNOSIS — G93.2 IIH (IDIOPATHIC INTRACRANIAL HYPERTENSION): Primary | ICD-10-CM

## 2025-09-04 PROBLEM — E66.812 CLASS 2 SEVERE OBESITY WITH SERIOUS COMORBIDITY IN ADULT: Status: ACTIVE | Noted: 2020-09-15

## 2025-09-04 PROBLEM — E66.01 CLASS 2 SEVERE OBESITY WITH SERIOUS COMORBIDITY IN ADULT: Status: ACTIVE | Noted: 2020-09-15

## 2025-09-04 PROCEDURE — 3074F SYST BP LT 130 MM HG: CPT | Performed by: STUDENT IN AN ORGANIZED HEALTH CARE EDUCATION/TRAINING PROGRAM

## 2025-09-04 PROCEDURE — 3078F DIAST BP <80 MM HG: CPT | Performed by: STUDENT IN AN ORGANIZED HEALTH CARE EDUCATION/TRAINING PROGRAM

## 2025-09-04 PROCEDURE — 99203 OFFICE O/P NEW LOW 30 MIN: CPT | Performed by: STUDENT IN AN ORGANIZED HEALTH CARE EDUCATION/TRAINING PROGRAM

## 2025-09-04 PROCEDURE — 1036F TOBACCO NON-USER: CPT | Performed by: STUDENT IN AN ORGANIZED HEALTH CARE EDUCATION/TRAINING PROGRAM

## 2025-09-04 PROCEDURE — 99205 OFFICE O/P NEW HI 60 MIN: CPT | Performed by: STUDENT IN AN ORGANIZED HEALTH CARE EDUCATION/TRAINING PROGRAM

## 2025-09-04 PROCEDURE — RXMED WILLOW AMBULATORY MEDICATION CHARGE

## 2025-09-04 RX ORDER — ACETAZOLAMIDE 250 MG/1
500 TABLET ORAL 2 TIMES DAILY
Qty: 120 TABLET | Refills: 5 | Status: SHIPPED | OUTPATIENT
Start: 2025-09-04 | End: 2025-10-05

## 2025-09-04 RX ORDER — TOPIRAMATE 25 MG/1
25 TABLET, FILM COATED ORAL 2 TIMES DAILY
Qty: 60 TABLET | Refills: 5 | Status: SHIPPED | OUTPATIENT
Start: 2025-09-04

## 2025-09-04 ASSESSMENT — PATIENT HEALTH QUESTIONNAIRE - PHQ9
2. FEELING DOWN, DEPRESSED OR HOPELESS: NOT AT ALL
SUM OF ALL RESPONSES TO PHQ9 QUESTIONS 1 AND 2: 0
1. LITTLE INTEREST OR PLEASURE IN DOING THINGS: NOT AT ALL

## 2025-09-04 ASSESSMENT — ENCOUNTER SYMPTOMS
DEPRESSION: 0
LOSS OF SENSATION IN FEET: 0
OCCASIONAL FEELINGS OF UNSTEADINESS: 0

## 2025-09-04 ASSESSMENT — PAIN SCALES - GENERAL: PAINLEVEL_OUTOF10: 0-NO PAIN

## 2025-09-05 ENCOUNTER — HOSPITAL ENCOUNTER (OUTPATIENT)
Dept: CARDIOLOGY | Facility: CLINIC | Age: 55
Discharge: HOME | End: 2025-09-05
Payer: COMMERCIAL

## 2025-09-05 ENCOUNTER — APPOINTMENT (OUTPATIENT)
Dept: CARDIOLOGY | Facility: CLINIC | Age: 55
End: 2025-09-05
Payer: COMMERCIAL

## 2025-09-05 ENCOUNTER — PHARMACY VISIT (OUTPATIENT)
Dept: PHARMACY | Facility: CLINIC | Age: 55
End: 2025-09-05
Payer: COMMERCIAL

## 2025-09-05 VITALS
HEIGHT: 68 IN | SYSTOLIC BLOOD PRESSURE: 118 MMHG | DIASTOLIC BLOOD PRESSURE: 72 MMHG | WEIGHT: 215 LBS | BODY MASS INDEX: 32.58 KG/M2

## 2025-09-05 DIAGNOSIS — R94.31 ABNORMAL ELECTROCARDIOGRAM (ECG) (EKG): ICD-10-CM

## 2025-09-05 DIAGNOSIS — R00.2 PALPITATIONS: ICD-10-CM

## 2025-09-05 DIAGNOSIS — I10 PRIMARY HYPERTENSION: ICD-10-CM

## 2025-09-05 LAB — BODY SURFACE AREA: 2.16 M2

## 2025-09-05 PROCEDURE — 93306 TTE W/DOPPLER COMPLETE: CPT

## 2025-09-05 PROCEDURE — 93306 TTE W/DOPPLER COMPLETE: CPT | Performed by: INTERNAL MEDICINE

## 2025-09-07 LAB
AORTIC VALVE MEAN GRADIENT: 9 MMHG
AORTIC VALVE PEAK VELOCITY: 1.92 M/S
AV PEAK GRADIENT: 15 MMHG
AVA (PEAK VEL): 1.63 CM2
AVA (VTI): 1.85 CM2
EJECTION FRACTION APICAL 4 CHAMBER: 65.1
EJECTION FRACTION: 61 %
LEFT ATRIUM VOLUME AREA LENGTH INDEX BSA: 17.2 ML/M2
LEFT VENTRICLE INTERNAL DIMENSION DIASTOLE: 5.12 CM (ref 3.5–6)
LEFT VENTRICULAR OUTFLOW TRACT DIAMETER: 1.81 CM
LV EJECTION FRACTION BIPLANE: 61 %
MITRAL VALVE E/A RATIO: 1.01
RIGHT VENTRICLE PEAK SYSTOLIC PRESSURE: 25 MMHG
TRICUSPID ANNULAR PLANE SYSTOLIC EXCURSION: 2.1 CM

## 2025-09-19 ENCOUNTER — APPOINTMENT (OUTPATIENT)
Dept: PRIMARY CARE | Facility: CLINIC | Age: 55
End: 2025-09-19
Payer: COMMERCIAL

## 2025-09-19 ENCOUNTER — APPOINTMENT (OUTPATIENT)
Dept: CARDIOLOGY | Facility: CLINIC | Age: 55
End: 2025-09-19
Payer: COMMERCIAL

## 2025-11-17 ENCOUNTER — APPOINTMENT (OUTPATIENT)
Dept: CARDIOLOGY | Facility: CLINIC | Age: 55
End: 2025-11-17
Payer: COMMERCIAL

## 2025-11-20 ENCOUNTER — APPOINTMENT (OUTPATIENT)
Facility: CLINIC | Age: 55
End: 2025-11-20
Payer: COMMERCIAL